# Patient Record
Sex: FEMALE | Race: BLACK OR AFRICAN AMERICAN | NOT HISPANIC OR LATINO | URBAN - METROPOLITAN AREA
[De-identification: names, ages, dates, MRNs, and addresses within clinical notes are randomized per-mention and may not be internally consistent; named-entity substitution may affect disease eponyms.]

---

## 2017-07-03 ENCOUNTER — EMERGENCY (EMERGENCY)
Facility: HOSPITAL | Age: 57
LOS: 1 days | Discharge: HOME | End: 2017-07-03
Attending: EMERGENCY MEDICINE | Admitting: EMERGENCY MEDICINE
Payer: COMMERCIAL

## 2017-07-03 VITALS
HEART RATE: 89 BPM | TEMPERATURE: 97 F | SYSTOLIC BLOOD PRESSURE: 135 MMHG | OXYGEN SATURATION: 96 % | DIASTOLIC BLOOD PRESSURE: 88 MMHG | RESPIRATION RATE: 16 BRPM

## 2017-07-03 VITALS
WEIGHT: 289.91 LBS | OXYGEN SATURATION: 96 % | TEMPERATURE: 99 F | DIASTOLIC BLOOD PRESSURE: 83 MMHG | SYSTOLIC BLOOD PRESSURE: 150 MMHG | HEART RATE: 105 BPM | RESPIRATION RATE: 22 BRPM | HEIGHT: 63 IN

## 2017-07-03 PROCEDURE — 71046 X-RAY EXAM CHEST 2 VIEWS: CPT

## 2017-07-03 PROCEDURE — 71020: CPT | Mod: 26

## 2017-07-03 PROCEDURE — 71120 X-RAY EXAM BREASTBONE 2/>VWS: CPT

## 2017-07-03 PROCEDURE — 99284 EMERGENCY DEPT VISIT MOD MDM: CPT | Mod: 25

## 2017-07-03 PROCEDURE — 72110 X-RAY EXAM L-2 SPINE 4/>VWS: CPT

## 2017-07-03 PROCEDURE — 72110 X-RAY EXAM L-2 SPINE 4/>VWS: CPT | Mod: 26

## 2017-07-03 PROCEDURE — 71120 X-RAY EXAM BREASTBONE 2/>VWS: CPT | Mod: 26

## 2017-07-03 PROCEDURE — 73562 X-RAY EXAM OF KNEE 3: CPT

## 2017-07-03 PROCEDURE — 73562 X-RAY EXAM OF KNEE 3: CPT | Mod: 26,50

## 2017-07-03 PROCEDURE — 93010 ELECTROCARDIOGRAM REPORT: CPT

## 2017-07-03 PROCEDURE — 93005 ELECTROCARDIOGRAM TRACING: CPT

## 2017-07-03 PROCEDURE — 99284 EMERGENCY DEPT VISIT MOD MDM: CPT

## 2017-07-03 RX ORDER — IBUPROFEN 200 MG
1 TABLET ORAL
Qty: 40 | Refills: 0
Start: 2017-07-03 | End: 2017-07-13

## 2017-07-03 RX ORDER — IBUPROFEN 200 MG
600 TABLET ORAL ONCE
Qty: 0 | Refills: 0 | Status: COMPLETED | OUTPATIENT
Start: 2017-07-03 | End: 2017-07-03

## 2017-07-03 RX ADMIN — Medication 600 MILLIGRAM(S): at 19:30

## 2017-07-03 RX ADMIN — Medication 600 MILLIGRAM(S): at 19:19
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
## 2017-07-03 NOTE — ED PROVIDER NOTE - MUSCULOSKELETAL, MLM
Spine appears normal, range of motion is not limited. sternal tenderness. ribs non-tender bilaterally. tender lower back and bilateral knees.
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
## 2019-12-31 ENCOUNTER — EMERGENCY (EMERGENCY)
Facility: HOSPITAL | Age: 59
LOS: 1 days | Discharge: HOME | End: 2019-12-31
Attending: EMERGENCY MEDICINE | Admitting: EMERGENCY MEDICINE
Payer: COMMERCIAL

## 2019-12-31 VITALS
SYSTOLIC BLOOD PRESSURE: 165 MMHG | DIASTOLIC BLOOD PRESSURE: 88 MMHG | TEMPERATURE: 98 F | HEART RATE: 92 BPM | RESPIRATION RATE: 17 BRPM | HEIGHT: 63 IN | OXYGEN SATURATION: 95 % | WEIGHT: 279.99 LBS

## 2019-12-31 DIAGNOSIS — R04.0 EPISTAXIS: ICD-10-CM

## 2019-12-31 PROBLEM — I10 ESSENTIAL (PRIMARY) HYPERTENSION: Chronic | Status: ACTIVE | Noted: 2017-07-03

## 2019-12-31 PROCEDURE — 99283 EMERGENCY DEPT VISIT LOW MDM: CPT

## 2019-12-31 NOTE — ED PROVIDER NOTE - CLINICAL SUMMARY MEDICAL DECISION MAKING FREE TEXT BOX
Pt is 60 y/o female, obese, with PMhx of HTN here for eval of epistaxis from Rt nostril earlier today. As per pt she was drinking tea and had spontaneous bleed from Rt nostril which was controlled with pressure. NOT bleeding currently. Denies HA, dizziness, cp, sob, palpitations, denies HA, blood thinners use, denies recent falls/head injury, easily bruising or gum bleeding. no URI sx. no active bleeding noted, pt is currently asymptomatic, bleeding controlled - pt advised to use humidifier at home, vaseline to nostrils, ent f/u if needed;

## 2019-12-31 NOTE — ED PROVIDER NOTE - PATIENT PORTAL LINK FT
You can access the FollowMyHealth Patient Portal offered by [facility] by registering at the following website: [email or web address]. By joining [employer]’s FollowMyHealth portal, you will also be able to view your health information using other applications (apps) compatible with our system.

## 2019-12-31 NOTE — ED PROVIDER NOTE - ATTENDING CONTRIBUTION TO CARE
I personally evaluated the patient. I reviewed the Resident’s or Physician Assistant’s note (as assigned above), and agree with the findings and plan except as documented in my note.    Patient developed a spontaneous nosebleed . Bleeding has stopped . No fever    PE: nares clear   no bleeding    observe

## 2019-12-31 NOTE — ED PROVIDER NOTE - NSFOLLOWUPINSTRUCTIONS_ED_ALL_ED_FT
PLEASE MAKE SURE THAT YOU USE HUMIDIFIER AT HOME, APPLY VASELINE TO NOSTRILS BEFORE GOING TO BED TO MOISTURIZE THE AREA. IF YOU START BLEEDING AGAIN, PLEASE APPLY PRESSURE TO THE AREA;     Nosebleed    WHAT YOU NEED TO KNOW:    A nosebleed, or epistaxis, occurs when one or more of the blood vessels in your nose break. You may have dark or bright red blood from one or both nostrils. A nosebleed is most commonly caused by dry air or picking your nose. A direct blow to your nose, irritation from a cold or allergies, or a foreign object can also cause a nosebleed.     DISCHARGE INSTRUCTIONS:    Return to the emergency department if:     Your nasal packing is soaked with blood.      Your nose is still bleeding after 20 minutes, even after you pinch it.       You have a foul-smelling discharge coming out of your nose.      You feel so weak and dizzy that you have trouble standing up.      You have trouble breathing or talking.     Contact your healthcare provider if:     You have a fever and are vomiting.      You have pain in and around your nose that is getting worse even after you take pain medicines.      Your nasal pack is loose.      You have questions or concerns about your condition or care.    First aid:     Sit up and lean forward. This will help prevent you from swallowing blood. Spit blood and saliva into a bowl.       Apply pressure to your nose. Use 2 fingers to pinch your nose shut for 10       Apply ice on the bridge of your nose to decrease swelling and bleeding. Use a cold pack or put crushed ice in a plastic bag. Cover it with a towel to protect your skin.      Pack your nose with a cotton ball, tissue, tampon, or gauze bandage to stop the bleeding.    Medicines:     Medicines applied to a small piece of cotton and placed in your nose. Medicine may also be sprayed in or applied directly to your nose. You may need medicine to prevent an infection. If bleeding is severe, medicine may be injected into a blood vessel in your nose.       Take your medicine as directed. Contact your healthcare provider if you think your medicine is not helping or if you have side effects. Tell him of her if you are allergic to any medicine. Keep a list of the medicines, vitamins, and herbs you take. Include the amounts, and when and why you take them. Bring the list or the pill bottles to follow-up visits. Carry your medicine list with you in case of an emergency.    Prevent another nosebleed:     Keep your nose moist. Put a small amount of petroleum jelly inside your nostrils as needed. Use a saline (saltwater) nasal spray. Do not put anything else inside your nose unless your healthcare provider says it is okay. Do not use oil-based lubricants if you use oxygen therapy. They may be flammable.      Use a cool mist humidifier to increase air moisture in your home. This will help your nose stay moist.       Do not pick or blow your nose for at least a week. You can irritate or damage your nose if you pick it. Blowing your nose too hard may cause the bleeding to start again. Do not bend over or strain as this can cause the bleeding to start again.      Avoid irritants such as tobacco smoke or chemical sprays such as .    Follow up with your healthcare provider as directed: Any packing in your nose should be removed within 2 to 3 days.

## 2019-12-31 NOTE — ED PROVIDER NOTE - OBJECTIVE STATEMENT
Pt is 60 y/o female, obese, with PMhx of HTN here for eval of epistaxis from Rt nostril earlier today. As per pt she was drinking tea and had spontaneous bleed from Rt nostril which was controlled with pressure. NOT bleeding currently. Denies HA, dizziness, cp, sob, palpitations, denies HA, blood thinners use, denies recent falls/head injury, easily bruising or gum bleeding. no URI sx.

## 2019-12-31 NOTE — ED ADULT NURSE NOTE - OBJECTIVE STATEMENT
Pt reports sudden onset nose bleed about thirty minutes prior to arrival to ED. On arrival to ED, nose bleed has stopped. Pt denies presence of pain. Denies chest pain, shortness of breath.

## 2020-06-10 ENCOUNTER — LABORATORY RESULT (OUTPATIENT)
Age: 60
End: 2020-06-10

## 2020-06-10 ENCOUNTER — APPOINTMENT (OUTPATIENT)
Dept: INTERNAL MEDICINE | Facility: CLINIC | Age: 60
End: 2020-06-10
Payer: MEDICAID

## 2020-06-10 VITALS
WEIGHT: 293 LBS | HEIGHT: 63 IN | SYSTOLIC BLOOD PRESSURE: 148 MMHG | DIASTOLIC BLOOD PRESSURE: 80 MMHG | HEART RATE: 83 BPM | OXYGEN SATURATION: 94 % | BODY MASS INDEX: 51.91 KG/M2 | TEMPERATURE: 98.6 F

## 2020-06-10 VITALS — SYSTOLIC BLOOD PRESSURE: 142 MMHG | DIASTOLIC BLOOD PRESSURE: 70 MMHG

## 2020-06-10 DIAGNOSIS — Z82.49 FAMILY HISTORY OF ISCHEMIC HEART DISEASE AND OTHER DISEASES OF THE CIRCULATORY SYSTEM: ICD-10-CM

## 2020-06-10 DIAGNOSIS — F51.05 ANXIETY DISORDER, UNSPECIFIED: ICD-10-CM

## 2020-06-10 DIAGNOSIS — Z87.891 PERSONAL HISTORY OF NICOTINE DEPENDENCE: ICD-10-CM

## 2020-06-10 DIAGNOSIS — F41.9 ANXIETY DISORDER, UNSPECIFIED: ICD-10-CM

## 2020-06-10 DIAGNOSIS — F41.8 OTHER SPECIFIED ANXIETY DISORDERS: ICD-10-CM

## 2020-06-10 DIAGNOSIS — H69.83 OTHER SPECIFIED DISORDERS OF EUSTACHIAN TUBE, BILATERAL: ICD-10-CM

## 2020-06-10 DIAGNOSIS — Z11.59 ENCOUNTER FOR SCREENING FOR OTHER VIRAL DISEASES: ICD-10-CM

## 2020-06-10 PROCEDURE — 99204 OFFICE O/P NEW MOD 45 MIN: CPT | Mod: 25

## 2020-06-10 PROCEDURE — 36415 COLL VENOUS BLD VENIPUNCTURE: CPT
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
## 2020-06-10 NOTE — PLAN
[FreeTextEntry1] : Start Temazepam for insomnia.  website consulted.\par BP borderline elevated - renewed atenolol-chlorthalidone. Low salt diet.\par Dizziness of unclear etiology. Check labs - CBC, CMP, TSH, B12, A1c, lipids, CRP, lyme.\par Neurology referral for headaches and UE pain likely neuropathy, possible cervical radiculopathy. \par \par RTO 1 week for f/u and discuss results.

## 2020-06-10 NOTE — REVIEW OF SYSTEMS
[Recent Change In Weight] : ~T recent weight change [Joint Pain] : joint pain [Headache] : headache [Dizziness] : dizziness [Insomnia] : insomnia [Anxiety] : anxiety [Depression] : depression [Negative] : Heme/Lymph [Vision Problems] : vision problems [Earache] : earache [Chest Pain] : chest pain [Back Pain] : back pain [Palpitations] : no palpitations [Lower Ext Edema] : no lower extremity edema [Confusion] : no confusion [Memory Loss] : no memory loss [Unsteady Walking] : no ataxia [Suicidal] : not suicidal [FreeTextEntry2] : intentional 15 lb weight loss [FreeTextEntry9] : b/l arm / knee pain [de-identified] : twitching in hands / fingers

## 2020-06-10 NOTE — PHYSICAL EXAM
[Normal Oropharynx] : the oropharynx was normal [Normal TMs] : both tympanic membranes were normal [No Lymphadenopathy] : no lymphadenopathy [Supple] : supple [No Respiratory Distress] : no respiratory distress  [Clear to Auscultation] : lungs were clear to auscultation bilaterally [No Edema] : there was no peripheral edema [Normal] : normal rate, regular rhythm, normal S1 and S2 and no murmur heard [Soft] : abdomen soft [Non Tender] : non-tender [Non-distended] : non-distended [Normal Bowel Sounds] : normal bowel sounds [No Joint Swelling] : no joint swelling [No Rash] : no rash [Normal Gait] : normal gait [Normal Affect] : the affect was normal [de-identified] : friendly female [de-identified] : fullness in left popliteal fossa

## 2020-06-15 LAB
25(OH)D3 SERPL-MCNC: 13.4 NG/ML
ALBUMIN SERPL ELPH-MCNC: 4.5 G/DL
ALP BLD-CCNC: 84 U/L
ALT SERPL-CCNC: 29 U/L
ANION GAP SERPL CALC-SCNC: 15 MMOL/L
AST SERPL-CCNC: 20 U/L
B BURGDOR IGG+IGM SER QL IB: NORMAL
BASOPHILS # BLD AUTO: 0 K/UL
BASOPHILS NFR BLD AUTO: 0 %
BILIRUB SERPL-MCNC: 0.7 MG/DL
BUN SERPL-MCNC: 22 MG/DL
CALCIUM SERPL-MCNC: 9.6 MG/DL
CHLORIDE SERPL-SCNC: 100 MMOL/L
CHOLEST SERPL-MCNC: 214 MG/DL
CHOLEST/HDLC SERPL: 4.5 RATIO
CO2 SERPL-SCNC: 27 MMOL/L
CREAT SERPL-MCNC: 1.15 MG/DL
CRP SERPL HS-MCNC: 2.81 MG/L
EOSINOPHIL # BLD AUTO: 0.24 K/UL
EOSINOPHIL NFR BLD AUTO: 2.6 %
ESTIMATED AVERAGE GLUCOSE: 137 MG/DL
GLUCOSE SERPL-MCNC: 122 MG/DL
HBA1C MFR BLD HPLC: 6.4 %
HCT VFR BLD CALC: 43.9 %
HDLC SERPL-MCNC: 48 MG/DL
HGB BLD-MCNC: 12.8 G/DL
LDLC SERPL CALC-MCNC: 130 MG/DL
LYMPHOCYTES # BLD AUTO: 4.42 K/UL
LYMPHOCYTES NFR BLD AUTO: 48.7 %
MAN DIFF?: NORMAL
MCHC RBC-ENTMCNC: 23.8 PG
MCHC RBC-ENTMCNC: 29.2 GM/DL
MCV RBC AUTO: 81.8 FL
MONOCYTES # BLD AUTO: 0.32 K/UL
MONOCYTES NFR BLD AUTO: 3.5 %
NEUTROPHILS # BLD AUTO: 3.95 K/UL
NEUTROPHILS NFR BLD AUTO: 43.5 %
PLATELET # BLD AUTO: 306 K/UL
POTASSIUM SERPL-SCNC: 3.7 MMOL/L
PROT SERPL-MCNC: 7.5 G/DL
RBC # BLD: 5.37 M/UL
RBC # FLD: 21.5 %
SARS-COV-2 IGG SERPL IA-ACNC: 8.09 INDEX
SARS-COV-2 IGG SERPL QL IA: POSITIVE
SODIUM SERPL-SCNC: 142 MMOL/L
TRIGL SERPL-MCNC: 180 MG/DL
TSH SERPL-ACNC: 0.74 UIU/ML
VIT B12 SERPL-MCNC: 356 PG/ML
WBC # FLD AUTO: 9.08 K/UL

## 2020-06-17 ENCOUNTER — APPOINTMENT (OUTPATIENT)
Dept: INTERNAL MEDICINE | Facility: CLINIC | Age: 60
End: 2020-06-17
Payer: MEDICAID

## 2020-06-17 VITALS
TEMPERATURE: 97.9 F | BODY MASS INDEX: 51.91 KG/M2 | HEART RATE: 74 BPM | WEIGHT: 293 LBS | DIASTOLIC BLOOD PRESSURE: 88 MMHG | OXYGEN SATURATION: 95 % | SYSTOLIC BLOOD PRESSURE: 138 MMHG | HEIGHT: 63 IN

## 2020-06-17 VITALS — SYSTOLIC BLOOD PRESSURE: 126 MMHG | DIASTOLIC BLOOD PRESSURE: 64 MMHG

## 2020-06-17 DIAGNOSIS — M62.831 MUSCLE SPASM OF CALF: ICD-10-CM

## 2020-06-17 DIAGNOSIS — E55.9 VITAMIN D DEFICIENCY, UNSPECIFIED: ICD-10-CM

## 2020-06-17 DIAGNOSIS — R42 DIZZINESS AND GIDDINESS: ICD-10-CM

## 2020-06-17 PROCEDURE — 99214 OFFICE O/P EST MOD 30 MIN: CPT

## 2020-06-17 RX ORDER — SERTRALINE 25 MG/1
25 TABLET, FILM COATED ORAL
Qty: 30 | Refills: 1 | Status: DISCONTINUED | COMMUNITY
End: 2020-06-17

## 2020-06-17 NOTE — HISTORY OF PRESENT ILLNESS
[de-identified] : Patient comes for 1 week follow-up.\par \par She reports improvement of insomnia since starting the Temazepam. \par She complains of pulling sensation in legs at night, feels worried about blood clot. She does not think legs are more edematous.\par Labs discussed - notable for elevated A1c 6.4 consistent with prediabetes. She has mild hyperlipidemia and low vitamin D. \par She has not yet made an appointment with the neurologist. \par  [FreeTextEntry1] : F/U, discuss lab results

## 2020-06-17 NOTE — PLAN
[FreeTextEntry1] : She has borderline diabetes. Discussed reducing carb intake and increasing physical activity. Lose weight.\par She continues to have pain and fullness in left calf. Send for b/l venous doppler study r/o DVT.\par Will arrange for neurology appointment for headaches, dizziness and UE pain suspected neuropathy.\par BP is controlled on atenolol-chlorthalidone.\par \par RTO in 2 months for F/U.

## 2020-06-17 NOTE — PHYSICAL EXAM
[Normal Oropharynx] : the oropharynx was normal [Normal TMs] : both tympanic membranes were normal [No Lymphadenopathy] : no lymphadenopathy [Supple] : supple [No Respiratory Distress] : no respiratory distress  [Clear to Auscultation] : lungs were clear to auscultation bilaterally [Normal] : normal rate, regular rhythm, normal S1 and S2 and no murmur heard [No Edema] : there was no peripheral edema [Soft] : abdomen soft [Non Tender] : non-tender [Non-distended] : non-distended [Normal Bowel Sounds] : normal bowel sounds [No Joint Swelling] : no joint swelling [Normal Gait] : normal gait [No Rash] : no rash [Normal Affect] : the affect was normal [de-identified] : fullness in left popliteal fossa  [de-identified] : friendly

## 2020-06-17 NOTE — REVIEW OF SYSTEMS
[Vision Problems] : vision problems [Recent Change In Weight] : ~T recent weight change [Earache] : earache [Chest Pain] : chest pain [Joint Pain] : joint pain [Back Pain] : back pain [Headache] : headache [Dizziness] : dizziness [Anxiety] : anxiety [Insomnia] : insomnia [Depression] : depression [Negative] : Heme/Lymph [Palpitations] : no palpitations [Confusion] : no confusion [Lower Ext Edema] : no lower extremity edema [Memory Loss] : no memory loss [Unsteady Walking] : no ataxia [FreeTextEntry2] : intentional 15 lb weight loss [Suicidal] : not suicidal [FreeTextEntry9] : b/l arm / knee pain [de-identified] : twitching in hands / fingers

## 2020-06-19 ENCOUNTER — APPOINTMENT (OUTPATIENT)
Dept: NEUROLOGY | Facility: CLINIC | Age: 60
End: 2020-06-19

## 2020-07-08 ENCOUNTER — RX RENEWAL (OUTPATIENT)
Age: 60
End: 2020-07-08

## 2020-08-18 ENCOUNTER — APPOINTMENT (OUTPATIENT)
Dept: INTERNAL MEDICINE | Facility: CLINIC | Age: 60
End: 2020-08-18
Payer: MEDICAID

## 2020-08-18 VITALS
WEIGHT: 280 LBS | HEIGHT: 63 IN | BODY MASS INDEX: 49.61 KG/M2 | DIASTOLIC BLOOD PRESSURE: 82 MMHG | TEMPERATURE: 98.1 F | SYSTOLIC BLOOD PRESSURE: 130 MMHG

## 2020-08-18 PROCEDURE — 99214 OFFICE O/P EST MOD 30 MIN: CPT

## 2020-08-18 NOTE — PHYSICAL EXAM
[Normal Oropharynx] : the oropharynx was normal [Normal TMs] : both tympanic membranes were normal [No Lymphadenopathy] : no lymphadenopathy [Clear to Auscultation] : lungs were clear to auscultation bilaterally [No Respiratory Distress] : no respiratory distress  [Supple] : supple [Normal] : normal rate, regular rhythm, normal S1 and S2 and no murmur heard [No Edema] : there was no peripheral edema [Soft] : abdomen soft [Non Tender] : non-tender [Non-distended] : non-distended [Normal Bowel Sounds] : normal bowel sounds [No Joint Swelling] : no joint swelling [No Rash] : no rash [Normal Gait] : normal gait [Normal Affect] : the affect was normal [de-identified] : friendly  [de-identified] : upset

## 2020-08-18 NOTE — PLAN
[FreeTextEntry1] : Chronic lower extremity pain, difficulty with ambulation. Start PT.\par Will get her another neurology appointment for chronic headaches, dizziness, and possible cervical radiculopathy.\par BP is controlled on atenolol-chlorthalidone.\par \par RTO in 1 months for F/U.

## 2020-08-18 NOTE — HISTORY OF PRESENT ILLNESS
[FreeTextEntry1] : F/U, discuss lab results [de-identified] : Patient comes for 1 week follow-up.\par \par She reports improvement of insomnia since starting the Temazepam. \par She complains of pulling sensation in legs at night, feels worried about blood clot. She does not think legs are more edematous.\par Labs discussed - notable for elevated A1c 6.4 consistent with prediabetes. She has mild hyperlipidemia and low vitamin D. \par She has not yet made an appointment with the neurologist. \par

## 2020-08-18 NOTE — PHYSICAL EXAM
[Normal Oropharynx] : the oropharynx was normal [Normal TMs] : both tympanic membranes were normal [No Lymphadenopathy] : no lymphadenopathy [Supple] : supple [No Respiratory Distress] : no respiratory distress  [Clear to Auscultation] : lungs were clear to auscultation bilaterally [Normal] : normal rate, regular rhythm, normal S1 and S2 and no murmur heard [No Edema] : there was no peripheral edema [Soft] : abdomen soft [Non Tender] : non-tender [Non-distended] : non-distended [No Rash] : no rash [Normal Bowel Sounds] : normal bowel sounds [No Joint Swelling] : no joint swelling [Normal Affect] : the affect was normal [Normal Gait] : normal gait [de-identified] : friendly  [de-identified] : fullness in left popliteal fossa

## 2020-08-18 NOTE — REVIEW OF SYSTEMS
[Recent Change In Weight] : ~T recent weight change [Vision Problems] : vision problems [Earache] : earache [Chest Pain] : chest pain [Joint Pain] : joint pain [Back Pain] : back pain [Headache] : headache [Dizziness] : dizziness [Insomnia] : insomnia [Anxiety] : anxiety [Depression] : depression [Negative] : Heme/Lymph [Palpitations] : no palpitations [Lower Ext Edema] : no lower extremity edema [Confusion] : no confusion [Memory Loss] : no memory loss [Unsteady Walking] : no ataxia [Suicidal] : not suicidal [FreeTextEntry2] : intentional 15 lb weight loss [FreeTextEntry9] : b/l arm / knee pain [de-identified] : twitching in hands / fingers

## 2020-08-18 NOTE — HISTORY OF PRESENT ILLNESS
[FreeTextEntry1] : F/U [de-identified] : Patient comes for follow-up.\par \par She still has pain in lower legs, described as pulling sensation, has difficulty walking up / down stairs. DVT study was negative. She has chronic headaches and dizziness. She lost her balance last Saturday and fell on the grass. Did not get hurt. She missed her neurologist appointment in June and needs to reschedule. She needs FMLA forms completed for work. \par \par

## 2020-08-18 NOTE — REVIEW OF SYSTEMS
[Recent Change In Weight] : ~T recent weight change [Vision Problems] : vision problems [Joint Pain] : joint pain [Back Pain] : back pain [Headache] : headache [Dizziness] : dizziness [Anxiety] : anxiety [Insomnia] : insomnia [Depression] : depression [Negative] : Neurological [Chest Pain] : no chest pain [Earache] : no earache [Lower Ext Edema] : no lower extremity edema [Palpitations] : no palpitations [Confusion] : no confusion [Memory Loss] : no memory loss [Unsteady Walking] : no ataxia [Suicidal] : not suicidal [FreeTextEntry2] : intentional 17 lb weight loss [FreeTextEntry9] : b/l arm / knee pain [de-identified] : twitching in hands / fingers

## 2020-09-23 ENCOUNTER — APPOINTMENT (OUTPATIENT)
Dept: INTERNAL MEDICINE | Facility: CLINIC | Age: 60
End: 2020-09-23
Payer: MEDICAID

## 2020-09-23 VITALS
SYSTOLIC BLOOD PRESSURE: 140 MMHG | DIASTOLIC BLOOD PRESSURE: 88 MMHG | BODY MASS INDEX: 51.91 KG/M2 | WEIGHT: 293 LBS | OXYGEN SATURATION: 95 % | TEMPERATURE: 98.3 F | HEART RATE: 83 BPM | HEIGHT: 63 IN

## 2020-09-23 VITALS — DIASTOLIC BLOOD PRESSURE: 76 MMHG | SYSTOLIC BLOOD PRESSURE: 138 MMHG

## 2020-09-23 PROCEDURE — 99214 OFFICE O/P EST MOD 30 MIN: CPT

## 2020-09-23 RX ORDER — FLUTICASONE PROPIONATE 50 UG/1
50 SPRAY, METERED NASAL
Qty: 16 | Refills: 0 | Status: DISCONTINUED | COMMUNITY
Start: 2020-06-10 | End: 2020-09-23

## 2020-09-23 NOTE — HISTORY OF PRESENT ILLNESS
[FreeTextEntry1] : F/U [de-identified] : Patient comes for follow-up.\par \par She has had a few sessions at [facility], scheduled to go back tomorrow. She has felt some relief but still experiencing pain in legs and neck. She still needs to see neurologist as she continues to have dizziness and headaches. Still with numbness in arms into hands. She is taking Tylenol arthritis but not helping her. She would like another pain medication. \par

## 2020-09-23 NOTE — PHYSICAL EXAM
[Normal Oropharynx] : the oropharynx was normal [Normal TMs] : both tympanic membranes were normal [No Lymphadenopathy] : no lymphadenopathy [Supple] : supple [No Respiratory Distress] : no respiratory distress  [Clear to Auscultation] : lungs were clear to auscultation bilaterally [Normal] : normal rate, regular rhythm, normal S1 and S2 and no murmur heard [No Edema] : there was no peripheral edema [Soft] : abdomen soft [Non Tender] : non-tender [Non-distended] : non-distended [Normal Bowel Sounds] : normal bowel sounds [No Joint Swelling] : no joint swelling [No Rash] : no rash [Normal Gait] : normal gait [Normal Affect] : the affect was normal [Normal Mood] : the mood was normal [de-identified] : friendly

## 2020-09-23 NOTE — PLAN
[FreeTextEntry1] : Continue PT for chronic lower extremity pain. Start Mobic and Tizanidine for pain management.\par Will arrange again for her to see neurology for chronic headaches, dizziness, and possible cervical radiculopathy.\par BP is controlled on atenolol-chlorthalidone.\par Borderline diabetic, A1c 6.4 in June. Will repeat fasting labs in 1 month.\par She declined flu shot today.\par She will send me another copy of the FMLA form to complete.\par \par RTO in 1 months for fasting labs.

## 2020-09-25 ENCOUNTER — APPOINTMENT (OUTPATIENT)
Dept: NEUROLOGY | Facility: CLINIC | Age: 60
End: 2020-09-25
Payer: MEDICAID

## 2020-09-25 VITALS — HEIGHT: 63 IN | WEIGHT: 290 LBS | TEMPERATURE: 97.8 F | BODY MASS INDEX: 51.38 KG/M2

## 2020-09-25 PROCEDURE — 99204 OFFICE O/P NEW MOD 45 MIN: CPT

## 2020-10-26 ENCOUNTER — APPOINTMENT (OUTPATIENT)
Dept: INTERNAL MEDICINE | Facility: CLINIC | Age: 60
End: 2020-10-26

## 2020-11-10 ENCOUNTER — NON-APPOINTMENT (OUTPATIENT)
Age: 60
End: 2020-11-10

## 2020-11-13 ENCOUNTER — APPOINTMENT (OUTPATIENT)
Dept: NEUROLOGY | Facility: CLINIC | Age: 60
End: 2020-11-13
Payer: MEDICAID

## 2020-11-13 PROCEDURE — 95911 NRV CNDJ TEST 9-10 STUDIES: CPT

## 2020-11-13 PROCEDURE — 95886 MUSC TEST DONE W/N TEST COMP: CPT

## 2020-11-13 PROCEDURE — 99072 ADDL SUPL MATRL&STAF TM PHE: CPT

## 2020-11-17 ENCOUNTER — APPOINTMENT (OUTPATIENT)
Dept: INTERNAL MEDICINE | Facility: CLINIC | Age: 60
End: 2020-11-17
Payer: MEDICAID

## 2020-11-17 VITALS
SYSTOLIC BLOOD PRESSURE: 130 MMHG | DIASTOLIC BLOOD PRESSURE: 80 MMHG | HEIGHT: 63 IN | TEMPERATURE: 97.7 F | BODY MASS INDEX: 51.91 KG/M2 | HEART RATE: 88 BPM | OXYGEN SATURATION: 95 % | WEIGHT: 293 LBS

## 2020-11-17 DIAGNOSIS — Z23 ENCOUNTER FOR IMMUNIZATION: ICD-10-CM

## 2020-11-17 PROCEDURE — 90686 IIV4 VACC NO PRSV 0.5 ML IM: CPT

## 2020-11-17 PROCEDURE — G0008: CPT

## 2020-11-17 PROCEDURE — 99214 OFFICE O/P EST MOD 30 MIN: CPT | Mod: 25

## 2020-11-17 RX ORDER — MELOXICAM 15 MG/1
15 TABLET ORAL DAILY
Qty: 30 | Refills: 3 | Status: DISCONTINUED | COMMUNITY
Start: 2020-09-23 | End: 2020-11-17

## 2020-11-17 NOTE — PHYSICAL EXAM
[Normal Oropharynx] : the oropharynx was normal [Normal TMs] : both tympanic membranes were normal [No Lymphadenopathy] : no lymphadenopathy [Supple] : supple [No Respiratory Distress] : no respiratory distress  [Clear to Auscultation] : lungs were clear to auscultation bilaterally [Normal] : normal rate, regular rhythm, normal S1 and S2 and no murmur heard [No Edema] : there was no peripheral edema [Soft] : abdomen soft [Non Tender] : non-tender [Non-distended] : non-distended [Normal Bowel Sounds] : normal bowel sounds [No Joint Swelling] : no joint swelling [No Rash] : no rash [Normal Gait] : normal gait [Normal Affect] : the affect was normal [Normal Mood] : the mood was normal [de-identified] : friendly

## 2020-11-17 NOTE — PLAN
[FreeTextEntry1] : Continue PT for chronic lower extremity pain. Start trial of Naproxen for pain, Tramadol PRN for severe pain.\par Letter written to return to work next month. FMLA forms were completed. \par Follow-up with neurology in 1/21. Awaiting EMG report from last week.\par BP is controlled on atenolol-chlorthalidone.\par Borderline diabetic, A1c 6.4 in June. Will repeat fasting labs in 1 month.\par Flu shot given today.\par \par RTO 1 month for physical.\par

## 2020-11-17 NOTE — HISTORY OF PRESENT ILLNESS
[FreeTextEntry1] : HTN, prediabetes, HLD  [de-identified] : Patient comes for 2 month follow-up.\par \par She saw neurologist Dr. Cintron in 9/20 for headaches and dizziness. Had MRI and MRA head which was normal. She had EMG last week which was reportedly abnormal. No report available in chart.\par She is going to PT this afternoon.\par She remains in pain mostly in legs. She is not finding relief with meloxicam and tizanidine. \par She is not fasting today.

## 2020-11-17 NOTE — REVIEW OF SYSTEMS
[Recent Change In Weight] : ~T recent weight change [Vision Problems] : vision problems [Joint Pain] : joint pain [Back Pain] : back pain [Headache] : headache [Dizziness] : dizziness [Insomnia] : insomnia [Anxiety] : anxiety [Depression] : depression [Negative] : Heme/Lymph [Earache] : no earache [Chest Pain] : no chest pain [Palpitations] : no palpitations [Lower Ext Edema] : no lower extremity edema [Confusion] : no confusion [Memory Loss] : no memory loss [Unsteady Walking] : no ataxia [Suicidal] : not suicidal [FreeTextEntry2] : 13 lb gain [FreeTextEntry9] : b/l arm / knee pain [de-identified] : twitching in hands / fingers

## 2020-12-15 ENCOUNTER — APPOINTMENT (OUTPATIENT)
Dept: INTERNAL MEDICINE | Facility: CLINIC | Age: 60
End: 2020-12-15
Payer: MEDICAID

## 2020-12-15 VITALS
DIASTOLIC BLOOD PRESSURE: 76 MMHG | TEMPERATURE: 97.8 F | SYSTOLIC BLOOD PRESSURE: 132 MMHG | BODY MASS INDEX: 51.91 KG/M2 | WEIGHT: 293 LBS | HEIGHT: 63 IN

## 2020-12-15 DIAGNOSIS — B35.1 TINEA UNGUIUM: ICD-10-CM

## 2020-12-15 PROCEDURE — 99214 OFFICE O/P EST MOD 30 MIN: CPT | Mod: 25

## 2020-12-15 PROCEDURE — 99072 ADDL SUPL MATRL&STAF TM PHE: CPT

## 2020-12-15 PROCEDURE — 36415 COLL VENOUS BLD VENIPUNCTURE: CPT

## 2020-12-15 NOTE — HISTORY OF PRESENT ILLNESS
[FreeTextEntry1] : HTN, prediabetes, HLD  [de-identified] : Patient comes for 1 month follow-up.\par \par She continues to go to PT. She found relief in leg pain with taking Naproxen. Never tried Tramadol.\par Continues to have head and neck pain.\par She has improved the diet, still unable to lose weight.\par Her half-sister  2 days ago from cancer, unknown type.\par She needs FMLA forms completed again.

## 2020-12-15 NOTE — PHYSICAL EXAM
[Normal Oropharynx] : the oropharynx was normal [Normal TMs] : both tympanic membranes were normal [No Lymphadenopathy] : no lymphadenopathy [Supple] : supple [No Respiratory Distress] : no respiratory distress  [Clear to Auscultation] : lungs were clear to auscultation bilaterally [Normal] : normal rate, regular rhythm, normal S1 and S2 and no murmur heard [No Edema] : there was no peripheral edema [Soft] : abdomen soft [Non Tender] : non-tender [Non-distended] : non-distended [Normal Bowel Sounds] : normal bowel sounds [No Joint Swelling] : no joint swelling [No Rash] : no rash [Normal Gait] : normal gait [Normal Affect] : the affect was normal [Normal Mood] : the mood was normal [de-identified] : friendly

## 2020-12-15 NOTE — PLAN
[FreeTextEntry1] : Continue PT for chronic lower extremity pain.Continue Naproxen for pain, Tramadol PRN for severe pain.\par FMLA forms were completed. \par Order MRI c-spine to evaluate for possible cervical radiculopathy. \par BP is controlled on atenolol-chlorthalidone - renewed.\par Borderline diabetic, A1c 6.4 in June. Repeat fasting labs today.\par \par RTO 1 month for follow-up.\par

## 2020-12-15 NOTE — REVIEW OF SYSTEMS
[Joint Pain] : joint pain [Back Pain] : back pain [Headache] : headache [Dizziness] : dizziness [Insomnia] : insomnia [Anxiety] : anxiety [Depression] : depression [Negative] : Heme/Lymph [Recent Change In Weight] : ~T no recent weight change [Earache] : no earache [Chest Pain] : no chest pain [Palpitations] : no palpitations [Lower Ext Edema] : no lower extremity edema [Confusion] : no confusion [Memory Loss] : no memory loss [Unsteady Walking] : no ataxia [Suicidal] : not suicidal [FreeTextEntry9] : b/l arm / knee pain [de-identified] : twitching in hands / fingers

## 2020-12-18 ENCOUNTER — RX RENEWAL (OUTPATIENT)
Age: 60
End: 2020-12-18

## 2021-01-12 ENCOUNTER — APPOINTMENT (OUTPATIENT)
Dept: NEUROLOGY | Facility: CLINIC | Age: 61
End: 2021-01-12
Payer: MEDICAID

## 2021-01-12 DIAGNOSIS — G56.93 UNSPECIFIED MONONEUROPATHY OF BILATERAL UPPER LIMBS: ICD-10-CM

## 2021-01-12 DIAGNOSIS — R51.9 HEADACHE, UNSPECIFIED: ICD-10-CM

## 2021-01-12 PROCEDURE — 99214 OFFICE O/P EST MOD 30 MIN: CPT | Mod: 95

## 2021-01-16 LAB
ALBUMIN SERPL ELPH-MCNC: 4.4 G/DL
ALP BLD-CCNC: 99 U/L
ALT SERPL-CCNC: 13 U/L
ANION GAP SERPL CALC-SCNC: 12 MMOL/L
AST SERPL-CCNC: 13 U/L
BASOPHILS # BLD AUTO: 0.06 K/UL
BASOPHILS NFR BLD AUTO: 0.7 %
BILIRUB SERPL-MCNC: 0.6 MG/DL
BUN SERPL-MCNC: 20 MG/DL
CALCIUM SERPL-MCNC: 9.3 MG/DL
CHLORIDE SERPL-SCNC: 103 MMOL/L
CHOLEST SERPL-MCNC: 223 MG/DL
CO2 SERPL-SCNC: 27 MMOL/L
CREAT SERPL-MCNC: 1.1 MG/DL
EOSINOPHIL # BLD AUTO: 0.14 K/UL
EOSINOPHIL NFR BLD AUTO: 1.7 %
ESTIMATED AVERAGE GLUCOSE: 143 MG/DL
GLUCOSE SERPL-MCNC: 117 MG/DL
HBA1C MFR BLD HPLC: 6.6 %
HCT VFR BLD CALC: 43.1 %
HDLC SERPL-MCNC: 51 MG/DL
HGB BLD-MCNC: 12.5 G/DL
IMM GRANULOCYTES NFR BLD AUTO: 0.2 %
LDLC SERPL CALC-MCNC: 144 MG/DL
LYMPHOCYTES # BLD AUTO: 3.04 K/UL
LYMPHOCYTES NFR BLD AUTO: 36.8 %
MAN DIFF?: NORMAL
MCHC RBC-ENTMCNC: 24.2 PG
MCHC RBC-ENTMCNC: 29 GM/DL
MCV RBC AUTO: 83.5 FL
MONOCYTES # BLD AUTO: 0.54 K/UL
MONOCYTES NFR BLD AUTO: 6.5 %
NEUTROPHILS # BLD AUTO: 4.45 K/UL
NEUTROPHILS NFR BLD AUTO: 54.1 %
NONHDLC SERPL-MCNC: 172 MG/DL
PLATELET # BLD AUTO: 255 K/UL
POTASSIUM SERPL-SCNC: 3.9 MMOL/L
PROT SERPL-MCNC: 7.3 G/DL
RBC # BLD: 5.16 M/UL
RBC # FLD: 17.3 %
SODIUM SERPL-SCNC: 142 MMOL/L
TRIGL SERPL-MCNC: 138 MG/DL
TSH SERPL-ACNC: 0.57 UIU/ML
WBC # FLD AUTO: 8.25 K/UL

## 2021-01-19 ENCOUNTER — APPOINTMENT (OUTPATIENT)
Dept: INTERNAL MEDICINE | Facility: CLINIC | Age: 61
End: 2021-01-19

## 2021-01-27 ENCOUNTER — APPOINTMENT (OUTPATIENT)
Dept: INTERNAL MEDICINE | Facility: CLINIC | Age: 61
End: 2021-01-27
Payer: MEDICAID

## 2021-01-27 VITALS
DIASTOLIC BLOOD PRESSURE: 70 MMHG | BODY MASS INDEX: 51.91 KG/M2 | SYSTOLIC BLOOD PRESSURE: 132 MMHG | HEIGHT: 63 IN | TEMPERATURE: 98.1 F | WEIGHT: 293 LBS

## 2021-01-27 PROCEDURE — 99072 ADDL SUPL MATRL&STAF TM PHE: CPT

## 2021-01-27 PROCEDURE — 99214 OFFICE O/P EST MOD 30 MIN: CPT

## 2021-01-27 NOTE — REVIEW OF SYSTEMS
[Joint Pain] : joint pain [Back Pain] : back pain [Headache] : headache [Dizziness] : dizziness [Insomnia] : insomnia [Anxiety] : anxiety [Depression] : depression [Negative] : Heme/Lymph [Recent Change In Weight] : ~T no recent weight change [Earache] : no earache [Chest Pain] : no chest pain [Palpitations] : no palpitations [Lower Ext Edema] : no lower extremity edema [Confusion] : no confusion [Memory Loss] : no memory loss [Unsteady Walking] : no ataxia [Suicidal] : not suicidal [FreeTextEntry9] : b/l arm / knee pain [de-identified] : twitching in hands / fingers

## 2021-01-27 NOTE — HISTORY OF PRESENT ILLNESS
[FreeTextEntry1] : HTN, prediabetes, HLD  [de-identified] : Patient comes for 1 month follow-up.\par \par She had dizzy episode at work last week, felt like spinning sensation. \par Some days she feels very lightheaded. \par She never went for MRI C-spine, was approved by insurance.\par She needs work form completed from employer.\par Has chronic pain in neck and legs. Needs refill on Tramadol. \par She may need renewal on PT which has worked well for her. \par Her neurologist thinks she has carpal tunnel, referred her to hand ortho. \par Labs from last month reviewed with pt. A1c worse now 6.6, lipids higher. She has worked on diet, but enjoys chips. Not exercising.

## 2021-01-27 NOTE — PHYSICAL EXAM
[Normal Oropharynx] : the oropharynx was normal [Normal TMs] : both tympanic membranes were normal [No Lymphadenopathy] : no lymphadenopathy [Supple] : supple [No Respiratory Distress] : no respiratory distress  [Clear to Auscultation] : lungs were clear to auscultation bilaterally [Normal] : normal rate, regular rhythm, normal S1 and S2 and no murmur heard [No Edema] : there was no peripheral edema [Soft] : abdomen soft [Non Tender] : non-tender [Non-distended] : non-distended [Normal Bowel Sounds] : normal bowel sounds [No Joint Swelling] : no joint swelling [No Rash] : no rash [Normal Gait] : normal gait [Normal Affect] : the affect was normal [Normal Mood] : the mood was normal [de-identified] : friendly

## 2021-03-18 ENCOUNTER — APPOINTMENT (OUTPATIENT)
Dept: INTERNAL MEDICINE | Facility: CLINIC | Age: 61
End: 2021-03-18

## 2021-03-23 ENCOUNTER — APPOINTMENT (OUTPATIENT)
Dept: INTERNAL MEDICINE | Facility: CLINIC | Age: 61
End: 2021-03-23
Payer: MEDICAID

## 2021-03-23 VITALS
HEART RATE: 90 BPM | WEIGHT: 293 LBS | OXYGEN SATURATION: 95 % | HEIGHT: 71 IN | BODY MASS INDEX: 41.02 KG/M2 | TEMPERATURE: 97.6 F | RESPIRATION RATE: 16 BRPM

## 2021-03-23 VITALS — DIASTOLIC BLOOD PRESSURE: 60 MMHG | SYSTOLIC BLOOD PRESSURE: 110 MMHG

## 2021-03-23 DIAGNOSIS — L30.9 DERMATITIS, UNSPECIFIED: ICD-10-CM

## 2021-03-23 DIAGNOSIS — M53.3 SACROCOCCYGEAL DISORDERS, NOT ELSEWHERE CLASSIFIED: ICD-10-CM

## 2021-03-23 PROCEDURE — 99072 ADDL SUPL MATRL&STAF TM PHE: CPT

## 2021-03-23 PROCEDURE — 99214 OFFICE O/P EST MOD 30 MIN: CPT

## 2021-03-23 RX ORDER — TEMAZEPAM 15 MG/1
15 CAPSULE ORAL
Qty: 14 | Refills: 0 | Status: DISCONTINUED | COMMUNITY
Start: 2020-06-10 | End: 2021-03-23

## 2021-03-23 NOTE — PHYSICAL EXAM
[Normal Oropharynx] : the oropharynx was normal [Normal TMs] : both tympanic membranes were normal [No Lymphadenopathy] : no lymphadenopathy [Supple] : supple [No Respiratory Distress] : no respiratory distress  [Clear to Auscultation] : lungs were clear to auscultation bilaterally [Normal] : normal rate, regular rhythm, normal S1 and S2 and no murmur heard [No Edema] : there was no peripheral edema [Soft] : abdomen soft [Non Tender] : non-tender [Non-distended] : non-distended [Normal Bowel Sounds] : normal bowel sounds [No Joint Swelling] : no joint swelling [No Rash] : no rash [Normal Gait] : normal gait [Normal Affect] : the affect was normal [Normal Mood] : the mood was normal [de-identified] : friendly  [de-identified] : tender point in medial coccyx, no surrounding ecchymosis

## 2021-03-23 NOTE — HISTORY OF PRESENT ILLNESS
[FreeTextEntry8] : Patient comes for an acute visit. 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 Fell on back / buttock. No LOC. She went back home and rested.\par She ran out of her pain medications. \par She wants letter to stay out of work for a few days.\par She would like to go back to [facility] PT. \par She has dry skin on right wrist, has used dry skin cream. \par

## 2021-03-23 NOTE — PLAN
[FreeTextEntry1] : Restart Naproxen 500 mg BID for pain. Tramadol PRN. Medications renewed.  website consulted.\par Letter written to stay out of work until 3/26.\par Rx given for PT for chronic LE pain.\par STart Triamcinolone cream for hand dermatitis. Use moisturizing cream.\par HTN is well-controlled, continue antihypertensive medication.\par \par RTO 1 month.

## 2021-03-23 NOTE — REVIEW OF SYSTEMS
[Joint Pain] : joint pain [Back Pain] : back pain [Headache] : headache [Dizziness] : dizziness [Insomnia] : insomnia [Anxiety] : anxiety [Depression] : depression [Negative] : Heme/Lymph [Recent Change In Weight] : ~T no recent weight change [Earache] : no earache [Chest Pain] : no chest pain [Palpitations] : no palpitations [Lower Ext Edema] : no lower extremity edema [Confusion] : no confusion [Memory Loss] : no memory loss [Unsteady Walking] : no ataxia [Suicidal] : not suicidal [FreeTextEntry9] : b/l arm / knee pain
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
## 2021-04-23 ENCOUNTER — APPOINTMENT (OUTPATIENT)
Dept: CARDIOLOGY | Facility: CLINIC | Age: 61
End: 2021-04-23
Payer: MEDICAID

## 2021-04-23 ENCOUNTER — NON-APPOINTMENT (OUTPATIENT)
Age: 61
End: 2021-04-23

## 2021-04-23 VITALS
BODY MASS INDEX: 51.91 KG/M2 | SYSTOLIC BLOOD PRESSURE: 125 MMHG | HEART RATE: 76 BPM | HEIGHT: 63 IN | OXYGEN SATURATION: 97 % | DIASTOLIC BLOOD PRESSURE: 81 MMHG | WEIGHT: 293 LBS

## 2021-04-23 PROCEDURE — 99072 ADDL SUPL MATRL&STAF TM PHE: CPT

## 2021-04-23 PROCEDURE — 99204 OFFICE O/P NEW MOD 45 MIN: CPT

## 2021-04-23 PROCEDURE — 93000 ELECTROCARDIOGRAM COMPLETE: CPT

## 2021-04-23 NOTE — DISCUSSION/SUMMARY
[FreeTextEntry1] : It is not clear whether what was discovered is plaque related to hypertension and cholesterol, etc., or degree of thrombosis for which a cardioembolic work-up needs to be performed.  I have requested records for my review.  In the meantime, I have suggested an echocardiogram, a carotid ultrasound and a Holter monitor.  She will schedule these, and I will be in contact with her to discuss the results.

## 2021-04-23 NOTE — HISTORY OF PRESENT ILLNESS
[FreeTextEntry1] : Nicci presented to the office today for a cardiovascular evaluation.  She presents for the further evaluation of an abnormal eye examination, and a history of hypertension.\par \par She is now 60 years old, with a history of hypertension and borderline diabetes.  She has a history of mild hyperlipidemia.  She is not known to have underlying structural heart disease.  She does have a history of obesity.\par \par At baseline, she is sedentary.  She describes an intermittent sharp chest discomfort which may cut her breathing off a bit, but she does not have symptoms overall suspicious for angina.  She has a tendency toward lower extremity edema, likely on the basis of obesity.  She denies palpitations, dizziness and syncope.\par \par She recently went to the ophthalmologist.  No reports are available, but she was told of a "blood clot" behind her eye.  She was told to see a cardiologist.  She therefore presents today for evaluation.

## 2021-04-23 NOTE — PHYSICAL EXAM
[General Appearance - Well Developed] : well developed [Normal Appearance] : normal appearance [Well Groomed] : well groomed [General Appearance - Well Nourished] : well nourished [No Deformities] : no deformities [General Appearance - In No Acute Distress] : no acute distress [Normal Conjunctiva] : the conjunctiva exhibited no abnormalities [Eyelids - No Xanthelasma] : the eyelids demonstrated no xanthelasmas [Normal Oral Mucosa] : normal oral mucosa [No Oral Pallor] : no oral pallor [No Oral Cyanosis] : no oral cyanosis [Normal Jugular Venous A Waves Present] : normal jugular venous A waves present [Normal Jugular Venous V Waves Present] : normal jugular venous V waves present [No Jugular Venous Eastman A Waves] : no jugular venous eastman A waves [Heart Rate And Rhythm] : heart rate and rhythm were normal [Heart Sounds] : normal S1 and S2 [Murmurs] : no murmurs present [Respiration, Rhythm And Depth] : normal respiratory rhythm and effort [Exaggerated Use Of Accessory Muscles For Inspiration] : no accessory muscle use [Auscultation Breath Sounds / Voice Sounds] : lungs were clear to auscultation bilaterally [Abdomen Soft] : soft [Abdomen Tenderness] : non-tender [Abdomen Mass (___ Cm)] : no abdominal mass palpated [FreeTextEntry1] : Abnormal gait [Nail Clubbing] : no clubbing of the fingernails [Cyanosis, Localized] : no localized cyanosis [Petechial Hemorrhages (___cm)] : no petechial hemorrhages [Skin Color & Pigmentation] : normal skin color and pigmentation [] : no rash [No Venous Stasis] : no venous stasis [Skin Lesions] : no skin lesions [No Skin Ulcers] : no skin ulcer [No Xanthoma] : no  xanthoma was observed [Oriented To Time, Place, And Person] : oriented to person, place, and time [Affect] : the affect was normal [Mood] : the mood was normal [No Anxiety] : not feeling anxious

## 2021-04-29 ENCOUNTER — APPOINTMENT (OUTPATIENT)
Dept: INTERNAL MEDICINE | Facility: CLINIC | Age: 61
End: 2021-04-29
Payer: MEDICAID

## 2021-04-29 VITALS
BODY MASS INDEX: 51.91 KG/M2 | SYSTOLIC BLOOD PRESSURE: 144 MMHG | TEMPERATURE: 97.9 F | HEIGHT: 63 IN | WEIGHT: 293 LBS | DIASTOLIC BLOOD PRESSURE: 70 MMHG

## 2021-04-29 VITALS — SYSTOLIC BLOOD PRESSURE: 138 MMHG | DIASTOLIC BLOOD PRESSURE: 70 MMHG

## 2021-04-29 PROCEDURE — 99213 OFFICE O/P EST LOW 20 MIN: CPT

## 2021-04-29 PROCEDURE — 99072 ADDL SUPL MATRL&STAF TM PHE: CPT

## 2021-04-29 NOTE — HISTORY OF PRESENT ILLNESS
[FreeTextEntry1] : HTN, prediabetes, HLD  [de-identified] : Patient comes for 1 month follow-up.\par \par She saw cardiologist last week after being told that she had blood clot behind eye at ophthalmologist office. She is scheduled for Holter, echo, and carotid ultrasound next month.\par Her left leg continues to be very painful. She is concerned of DVT. Doppler study in 7/20 was normal. She would like Rx for Voltaren gel. \par She lost her script for PT.

## 2021-04-29 NOTE — REVIEW OF SYSTEMS
[Joint Pain] : joint pain [Back Pain] : back pain [Headache] : headache [Dizziness] : dizziness [Insomnia] : insomnia [Anxiety] : anxiety [Depression] : depression [Negative] : Heme/Lymph [Recent Change In Weight] : ~T no recent weight change [Earache] : no earache [Chest Pain] : no chest pain [Palpitations] : no palpitations [Lower Ext Edema] : no lower extremity edema [Confusion] : no confusion [Memory Loss] : no memory loss [Unsteady Walking] : no ataxia [Suicidal] : not suicidal [FreeTextEntry9] : lower leg pain L>R [de-identified] : twitching in hands / fingers

## 2021-04-29 NOTE — PHYSICAL EXAM
[Normal Oropharynx] : the oropharynx was normal [Normal TMs] : both tympanic membranes were normal [No Lymphadenopathy] : no lymphadenopathy [Supple] : supple [No Respiratory Distress] : no respiratory distress  [Clear to Auscultation] : lungs were clear to auscultation bilaterally [Normal] : normal rate, regular rhythm, normal S1 and S2 and no murmur heard [No Edema] : there was no peripheral edema [Soft] : abdomen soft [Non Tender] : non-tender [Non-distended] : non-distended [Normal Bowel Sounds] : normal bowel sounds [No Joint Swelling] : no joint swelling [No Rash] : no rash [Normal Gait] : normal gait [Normal Mood] : the mood was normal [Normal Affect] : the affect was normal [de-identified] : friendly

## 2021-05-18 ENCOUNTER — APPOINTMENT (OUTPATIENT)
Dept: CARDIOLOGY | Facility: CLINIC | Age: 61
End: 2021-05-18
Payer: MEDICAID

## 2021-05-18 PROCEDURE — 93224 XTRNL ECG REC UP TO 48 HRS: CPT

## 2021-05-19 ENCOUNTER — APPOINTMENT (OUTPATIENT)
Dept: CARDIOLOGY | Facility: CLINIC | Age: 61
End: 2021-05-19
Payer: MEDICAID

## 2021-05-19 PROCEDURE — 93306 TTE W/DOPPLER COMPLETE: CPT

## 2021-05-19 PROCEDURE — 93880 EXTRACRANIAL BILAT STUDY: CPT

## 2021-05-19 RX ORDER — PERFLUTREN 6.52 MG/ML
6.52 INJECTION, SUSPENSION INTRAVENOUS
Qty: 1 | Refills: 0 | Status: COMPLETED | OUTPATIENT
Start: 2021-05-19

## 2021-05-19 RX ADMIN — PERFLUTREN MG/ML: 6.52 INJECTION, SUSPENSION INTRAVENOUS at 00:00

## 2021-05-26 ENCOUNTER — RX RENEWAL (OUTPATIENT)
Age: 61
End: 2021-05-26

## 2021-06-10 ENCOUNTER — APPOINTMENT (OUTPATIENT)
Dept: INTERNAL MEDICINE | Facility: CLINIC | Age: 61
End: 2021-06-10
Payer: MEDICAID

## 2021-06-10 VITALS
OXYGEN SATURATION: 94 % | HEART RATE: 74 BPM | TEMPERATURE: 98.4 F | HEIGHT: 63 IN | BODY MASS INDEX: 51.91 KG/M2 | WEIGHT: 293 LBS | SYSTOLIC BLOOD PRESSURE: 110 MMHG | DIASTOLIC BLOOD PRESSURE: 70 MMHG

## 2021-06-10 DIAGNOSIS — G43.909 MIGRAINE, UNSPECIFIED, NOT INTRACTABLE, W/OUT STATUS MIGRAINOSUS: ICD-10-CM

## 2021-06-10 PROCEDURE — 99213 OFFICE O/P EST LOW 20 MIN: CPT

## 2021-06-10 RX ORDER — TRAMADOL HYDROCHLORIDE 50 MG/1
50 TABLET, COATED ORAL
Qty: 30 | Refills: 0 | Status: DISCONTINUED | COMMUNITY
Start: 2020-11-17 | End: 2021-06-10

## 2021-06-10 NOTE — REVIEW OF SYSTEMS
[Joint Pain] : joint pain [Back Pain] : back pain [Headache] : headache [Dizziness] : dizziness [Insomnia] : insomnia [Anxiety] : anxiety [Depression] : depression [Negative] : Heme/Lymph [Recent Change In Weight] : ~T no recent weight change [Earache] : no earache [Chest Pain] : no chest pain [Palpitations] : no palpitations [Lower Ext Edema] : no lower extremity edema [Confusion] : no confusion [Memory Loss] : no memory loss [Unsteady Walking] : no ataxia [Suicidal] : not suicidal [FreeTextEntry9] : lower leg pain L>R [de-identified] : twitching in hands / fingers

## 2021-06-10 NOTE — PHYSICAL EXAM
[Normal Oropharynx] : the oropharynx was normal [Normal TMs] : both tympanic membranes were normal [No Lymphadenopathy] : no lymphadenopathy [Supple] : supple [No Respiratory Distress] : no respiratory distress  [Clear to Auscultation] : lungs were clear to auscultation bilaterally [Normal] : normal rate, regular rhythm, normal S1 and S2 and no murmur heard [No Edema] : there was no peripheral edema [Soft] : abdomen soft [Non Tender] : non-tender [Non-distended] : non-distended [Normal Bowel Sounds] : normal bowel sounds [No Joint Swelling] : no joint swelling [No Rash] : no rash [Normal Gait] : normal gait [Normal Affect] : the affect was normal [Normal Mood] : the mood was normal [de-identified] : friendly

## 2021-06-10 NOTE — HISTORY OF PRESENT ILLNESS
[FreeTextEntry1] : HTN, prediabetes, HLD  [de-identified] : Patient comes for 1 month follow-up.\par \par She had bad headache and dizziness earlier today. She skipped work today as a result.\par She had sharp pain in upper back.\par She is scheduled this month to see pain management. She also scheduled appointment to go to PT.

## 2021-08-05 ENCOUNTER — APPOINTMENT (OUTPATIENT)
Dept: INTERNAL MEDICINE | Facility: CLINIC | Age: 61
End: 2021-08-05

## 2021-09-30 ENCOUNTER — APPOINTMENT (OUTPATIENT)
Dept: INTERNAL MEDICINE | Facility: CLINIC | Age: 61
End: 2021-09-30

## 2021-10-20 ENCOUNTER — APPOINTMENT (OUTPATIENT)
Dept: INTERNAL MEDICINE | Facility: CLINIC | Age: 61
End: 2021-10-20

## 2021-11-04 ENCOUNTER — APPOINTMENT (OUTPATIENT)
Dept: INTERNAL MEDICINE | Facility: CLINIC | Age: 61
End: 2021-11-04

## 2021-12-08 ENCOUNTER — APPOINTMENT (OUTPATIENT)
Dept: INTERNAL MEDICINE | Facility: CLINIC | Age: 61
End: 2021-12-08
Payer: MEDICAID

## 2021-12-08 VITALS
OXYGEN SATURATION: 97 % | WEIGHT: 293 LBS | TEMPERATURE: 98.6 F | HEART RATE: 74 BPM | HEIGHT: 63 IN | BODY MASS INDEX: 51.91 KG/M2 | DIASTOLIC BLOOD PRESSURE: 60 MMHG | SYSTOLIC BLOOD PRESSURE: 110 MMHG

## 2021-12-08 VITALS — SYSTOLIC BLOOD PRESSURE: 110 MMHG | DIASTOLIC BLOOD PRESSURE: 70 MMHG

## 2021-12-08 PROCEDURE — 90686 IIV4 VACC NO PRSV 0.5 ML IM: CPT

## 2021-12-08 PROCEDURE — G0008: CPT

## 2021-12-08 PROCEDURE — 99214 OFFICE O/P EST MOD 30 MIN: CPT | Mod: 25

## 2021-12-08 NOTE — PHYSICAL EXAM
[Normal Oropharynx] : the oropharynx was normal [Normal TMs] : both tympanic membranes were normal [No Lymphadenopathy] : no lymphadenopathy [Supple] : supple [No Respiratory Distress] : no respiratory distress  [Clear to Auscultation] : lungs were clear to auscultation bilaterally [Normal] : normal rate, regular rhythm, normal S1 and S2 and no murmur heard [No Edema] : there was no peripheral edema [Soft] : abdomen soft [Non Tender] : non-tender [Non-distended] : non-distended [Normal Bowel Sounds] : normal bowel sounds [No Joint Swelling] : no joint swelling [No Rash] : no rash [Normal Gait] : normal gait [Normal Affect] : the affect was normal [Normal Mood] : the mood was normal [de-identified] : friendly

## 2021-12-08 NOTE — HISTORY OF PRESENT ILLNESS
[FreeTextEntry1] : HTN, prediabetes, HLD  [de-identified] : Patient comes for 1 month follow-up.\par \par She never went to pain management. She has chronic neck/back pain. Has chronic LE pain. Her pain meds are no longer helping. Legs are very bothersome, has difficulty walking. She cannot lose weight, still weighs about 300 lb. She is tearful about her situation, is worried about her 's health.

## 2021-12-08 NOTE — REVIEW OF SYSTEMS
[Joint Pain] : joint pain [Back Pain] : back pain [Headache] : headache [Dizziness] : dizziness [Insomnia] : insomnia [Anxiety] : anxiety [Depression] : depression [Negative] : Heme/Lymph [Recent Change In Weight] : ~T no recent weight change [Earache] : no earache [Chest Pain] : no chest pain [Palpitations] : no palpitations [Lower Ext Edema] : no lower extremity edema [Confusion] : no confusion [Memory Loss] : no memory loss [Unsteady Walking] : no ataxia [Suicidal] : not suicidal [FreeTextEntry9] : lower leg pain L>R [de-identified] : twitching in hands / fingers

## 2022-01-10 ENCOUNTER — APPOINTMENT (OUTPATIENT)
Dept: INTERNAL MEDICINE | Facility: CLINIC | Age: 62
End: 2022-01-10
Payer: MEDICAID

## 2022-01-10 VITALS
DIASTOLIC BLOOD PRESSURE: 60 MMHG | BODY MASS INDEX: 51.91 KG/M2 | HEART RATE: 84 BPM | OXYGEN SATURATION: 94 % | WEIGHT: 293 LBS | SYSTOLIC BLOOD PRESSURE: 120 MMHG | HEIGHT: 63 IN | TEMPERATURE: 98.5 F

## 2022-01-10 DIAGNOSIS — M79.605 PAIN IN RIGHT LEG: ICD-10-CM

## 2022-01-10 DIAGNOSIS — Z12.31 ENCOUNTER FOR SCREENING MAMMOGRAM FOR MALIGNANT NEOPLASM OF BREAST: ICD-10-CM

## 2022-01-10 DIAGNOSIS — Z01.419 ENCOUNTER FOR GYNECOLOGICAL EXAMINATION (GENERAL) (ROUTINE) W/OUT ABNORMAL FINDINGS: ICD-10-CM

## 2022-01-10 DIAGNOSIS — M79.604 PAIN IN RIGHT LEG: ICD-10-CM

## 2022-01-10 PROCEDURE — 99214 OFFICE O/P EST MOD 30 MIN: CPT

## 2022-01-10 RX ORDER — BUTALBITAL, ACETAMINOPHEN AND CAFFEINE 325; 50; 40 MG/1; MG/1; MG/1
50-325-40 TABLET ORAL
Qty: 100 | Refills: 3 | Status: DISCONTINUED | COMMUNITY
Start: 2021-06-10 | End: 2022-01-10

## 2022-01-10 RX ORDER — TIZANIDINE 4 MG/1
4 TABLET ORAL
Qty: 30 | Refills: 3 | Status: DISCONTINUED | COMMUNITY
Start: 2020-09-23 | End: 2022-01-10

## 2022-01-10 RX ORDER — NAPROXEN 500 MG/1
500 TABLET ORAL
Qty: 60 | Refills: 1 | Status: DISCONTINUED | COMMUNITY
Start: 2020-11-17 | End: 2022-01-10

## 2022-01-10 RX ORDER — DICLOFENAC SODIUM 1% 10 MG/G
1 GEL TOPICAL TWICE DAILY
Qty: 1 | Refills: 3 | Status: ACTIVE | COMMUNITY
Start: 2021-04-29 | End: 1900-01-01

## 2022-01-10 NOTE — PHYSICAL EXAM
[Normal Oropharynx] : the oropharynx was normal [Normal TMs] : both tympanic membranes were normal [No Lymphadenopathy] : no lymphadenopathy [Supple] : supple [No Respiratory Distress] : no respiratory distress  [Clear to Auscultation] : lungs were clear to auscultation bilaterally [Normal] : normal rate, regular rhythm, normal S1 and S2 and no murmur heard [No Edema] : there was no peripheral edema [Soft] : abdomen soft [Non Tender] : non-tender [Non-distended] : non-distended [Normal Bowel Sounds] : normal bowel sounds [No Joint Swelling] : no joint swelling [No Rash] : no rash [Normal Gait] : normal gait [Normal Affect] : the affect was normal [Normal Mood] : the mood was normal [de-identified] : friendly

## 2022-01-10 NOTE — HISTORY OF PRESENT ILLNESS
[FreeTextEntry1] : HTN, prediabetes, HLD, chronic pain  [de-identified] : Patient comes for 1 month follow-up.\par \par She has chronic neck/back pain. Has chronic LE pain. Legs are very bothersome, has difficulty walking. She scheduled pain management consult next week. She found relief with Percocet. She needs FMLA forms completed.

## 2022-01-10 NOTE — REVIEW OF SYSTEMS
[Joint Pain] : joint pain [Back Pain] : back pain [Headache] : headache [Dizziness] : dizziness [Insomnia] : insomnia [Anxiety] : anxiety [Depression] : depression [Negative] : Heme/Lymph [Recent Change In Weight] : ~T no recent weight change [Earache] : no earache [Chest Pain] : no chest pain [Palpitations] : no palpitations [Lower Ext Edema] : no lower extremity edema [Confusion] : no confusion [Memory Loss] : no memory loss [Unsteady Walking] : no ataxia [Suicidal] : not suicidal [de-identified] : twitching in hands / fingers  [FreeTextEntry9] : lower leg pain L>R

## 2022-01-17 ENCOUNTER — TRANSCRIPTION ENCOUNTER (OUTPATIENT)
Age: 62
End: 2022-01-17

## 2022-01-26 ENCOUNTER — APPOINTMENT (OUTPATIENT)
Dept: INTERNAL MEDICINE | Facility: CLINIC | Age: 62
End: 2022-01-26
Payer: MEDICAID

## 2022-01-26 VITALS
RESPIRATION RATE: 18 BRPM | WEIGHT: 293 LBS | OXYGEN SATURATION: 96 % | HEART RATE: 77 BPM | DIASTOLIC BLOOD PRESSURE: 60 MMHG | SYSTOLIC BLOOD PRESSURE: 96 MMHG | HEIGHT: 63 IN | TEMPERATURE: 98.7 F | BODY MASS INDEX: 51.91 KG/M2

## 2022-01-26 VITALS — SYSTOLIC BLOOD PRESSURE: 138 MMHG | DIASTOLIC BLOOD PRESSURE: 70 MMHG

## 2022-01-26 DIAGNOSIS — R42 DIZZINESS AND GIDDINESS: ICD-10-CM

## 2022-01-26 DIAGNOSIS — R11.0 NAUSEA: ICD-10-CM

## 2022-01-26 PROCEDURE — 99214 OFFICE O/P EST MOD 30 MIN: CPT | Mod: 25

## 2022-01-26 NOTE — PLAN
[FreeTextEntry1] : Non-specific dizziness and nausea, possibly due to gastroenteritis.\par Send labs - CBC, CMP, TSH, B12, lipase, ESR, and CRP. \par Continue adequate fluids.\par Check lipid panel and A1c. Continue dietary improvements.\par BP controlled, continue Atenolol-Chlorthalidone.\par Copy of FMLA completed forms given to patient.\par

## 2022-01-26 NOTE — REVIEW OF SYSTEMS
[Joint Pain] : joint pain [Back Pain] : back pain [Headache] : headache [Dizziness] : dizziness [Insomnia] : insomnia [Anxiety] : anxiety [Depression] : depression [Negative] : Heme/Lymph [Recent Change In Weight] : ~T no recent weight change [Earache] : no earache [Chest Pain] : no chest pain [Palpitations] : no palpitations [Lower Ext Edema] : no lower extremity edema [Nausea] : nausea [Confusion] : no confusion [Memory Loss] : no memory loss [Unsteady Walking] : no ataxia [Suicidal] : not suicidal [FreeTextEntry9] : lower leg pain L>R [de-identified] : twitching in hands / fingers

## 2022-01-26 NOTE — PHYSICAL EXAM
[Normal Oropharynx] : the oropharynx was normal [Normal TMs] : both tympanic membranes were normal [No Lymphadenopathy] : no lymphadenopathy [Supple] : supple [No Respiratory Distress] : no respiratory distress  [Clear to Auscultation] : lungs were clear to auscultation bilaterally [Normal] : normal rate, regular rhythm, normal S1 and S2 and no murmur heard [No Edema] : there was no peripheral edema [Soft] : abdomen soft [Non Tender] : non-tender [Non-distended] : non-distended [Normal Bowel Sounds] : normal bowel sounds [No Joint Swelling] : no joint swelling [No Rash] : no rash [Normal Gait] : normal gait [Normal Affect] : the affect was normal [Normal Mood] : the mood was normal [de-identified] : friendly

## 2022-01-26 NOTE — HISTORY OF PRESENT ILLNESS
[FreeTextEntry8] : Patient comes for an acute visit. \par \par She is here for evaluation of dizziness, fatigue, and nausea for few days. No fever or chills.\par She gets nauseous with anything she eats. \par She is drinking lot of water. \par No abdominal pain or diarrhea. \par

## 2022-01-30 LAB
ALBUMIN SERPL ELPH-MCNC: 4.3 G/DL
ALP BLD-CCNC: 98 U/L
ALT SERPL-CCNC: 14 U/L
ANION GAP SERPL CALC-SCNC: 13 MMOL/L
AST SERPL-CCNC: 14 U/L
BASOPHILS # BLD AUTO: 0.05 K/UL
BASOPHILS NFR BLD AUTO: 0.6 %
BILIRUB SERPL-MCNC: 0.4 MG/DL
BUN SERPL-MCNC: 21 MG/DL
CALCIUM SERPL-MCNC: 9 MG/DL
CHLORIDE SERPL-SCNC: 98 MMOL/L
CHOLEST SERPL-MCNC: 217 MG/DL
CO2 SERPL-SCNC: 30 MMOL/L
CREAT SERPL-MCNC: 1.03 MG/DL
CRP SERPL-MCNC: 7 MG/L
EOSINOPHIL # BLD AUTO: 0.17 K/UL
EOSINOPHIL NFR BLD AUTO: 1.9 %
ERYTHROCYTE [SEDIMENTATION RATE] IN BLOOD BY WESTERGREN METHOD: 74 MM/HR
ESTIMATED AVERAGE GLUCOSE: 157 MG/DL
GLUCOSE SERPL-MCNC: 153 MG/DL
HBA1C MFR BLD HPLC: 7.1 %
HCT VFR BLD CALC: 42.2 %
HDLC SERPL-MCNC: 47 MG/DL
HGB BLD-MCNC: 12.6 G/DL
IMM GRANULOCYTES NFR BLD AUTO: 0.2 %
LDLC SERPL CALC-MCNC: 136 MG/DL
LPL SERPL-CCNC: 42 U/L
LYMPHOCYTES # BLD AUTO: 2.94 K/UL
LYMPHOCYTES NFR BLD AUTO: 33 %
MAN DIFF?: NORMAL
MCHC RBC-ENTMCNC: 23.6 PG
MCHC RBC-ENTMCNC: 29.9 GM/DL
MCV RBC AUTO: 78.9 FL
MONOCYTES # BLD AUTO: 0.63 K/UL
MONOCYTES NFR BLD AUTO: 7.1 %
NEUTROPHILS # BLD AUTO: 5.11 K/UL
NEUTROPHILS NFR BLD AUTO: 57.2 %
NONHDLC SERPL-MCNC: 170 MG/DL
PLATELET # BLD AUTO: 250 K/UL
POTASSIUM SERPL-SCNC: 5.1 MMOL/L
PROT SERPL-MCNC: 7.4 G/DL
RBC # BLD: 5.35 M/UL
RBC # FLD: 17.4 %
SODIUM SERPL-SCNC: 141 MMOL/L
TRIGL SERPL-MCNC: 169 MG/DL
TSH SERPL-ACNC: 0.59 UIU/ML
VIT B12 SERPL-MCNC: 340 PG/ML
WBC # FLD AUTO: 8.92 K/UL

## 2022-02-03 ENCOUNTER — NON-APPOINTMENT (OUTPATIENT)
Age: 62
End: 2022-02-03

## 2022-02-14 ENCOUNTER — APPOINTMENT (OUTPATIENT)
Dept: INTERNAL MEDICINE | Facility: CLINIC | Age: 62
End: 2022-02-14
Payer: MEDICAID

## 2022-02-14 VITALS
HEIGHT: 63 IN | HEART RATE: 80 BPM | SYSTOLIC BLOOD PRESSURE: 130 MMHG | DIASTOLIC BLOOD PRESSURE: 80 MMHG | WEIGHT: 293 LBS | OXYGEN SATURATION: 96 % | BODY MASS INDEX: 51.91 KG/M2

## 2022-02-14 DIAGNOSIS — N89.8 OTHER SPECIFIED NONINFLAMMATORY DISORDERS OF VAGINA: ICD-10-CM

## 2022-02-14 PROCEDURE — 99214 OFFICE O/P EST MOD 30 MIN: CPT

## 2022-02-14 RX ORDER — AMOXICILLIN 500 MG/1
500 CAPSULE ORAL
Qty: 21 | Refills: 0 | Status: DISCONTINUED | COMMUNITY
Start: 2021-10-13

## 2022-02-14 RX ORDER — SULFACETAMIDE SODIUM 100 MG/ML
10 SOLUTION OPHTHALMIC
Qty: 15 | Refills: 0 | Status: DISCONTINUED | COMMUNITY
Start: 2022-01-17

## 2022-02-14 NOTE — HISTORY OF PRESENT ILLNESS
[FreeTextEntry1] : HTN, HLD, DM [de-identified] : Patient comes for follow-up.\par \par Had labs last month. A1c donnie to 7.1. Gained weight now 312 lb. She is motivated to lose weight.\par The dizziness has improved but still queasy at times.\par She complains of vaginal pruritus and urinary frequency. No dysuria or hematuria. Vaginal area is malodorous. \par Still with chronic pain in lower back and legs. She is having trouble scheduling appointment with pain management.

## 2022-02-14 NOTE — REVIEW OF SYSTEMS
[Joint Pain] : joint pain [Back Pain] : back pain [Headache] : headache [Dizziness] : dizziness [Insomnia] : insomnia [Anxiety] : anxiety [Depression] : depression [Negative] : Heme/Lymph [Recent Change In Weight] : ~T no recent weight change [Earache] : no earache [Chest Pain] : no chest pain [Palpitations] : no palpitations [Lower Ext Edema] : no lower extremity edema [Frequency] : frequency [Confusion] : no confusion [Memory Loss] : no memory loss [Unsteady Walking] : no ataxia [Suicidal] : not suicidal [FreeTextEntry8] : vaginal pruritus  [FreeTextEntry9] : lower leg pain L>R [de-identified] : twitching in hands / fingers

## 2022-02-14 NOTE — PHYSICAL EXAM
[Normal Oropharynx] : the oropharynx was normal [Normal TMs] : both tympanic membranes were normal [No Lymphadenopathy] : no lymphadenopathy [Supple] : supple [No Respiratory Distress] : no respiratory distress  [Clear to Auscultation] : lungs were clear to auscultation bilaterally [Normal] : normal rate, regular rhythm, normal S1 and S2 and no murmur heard [No Edema] : there was no peripheral edema [Soft] : abdomen soft [Non Tender] : non-tender [Non-distended] : non-distended [Normal Bowel Sounds] : normal bowel sounds [No Joint Swelling] : no joint swelling [No Rash] : no rash [Normal Gait] : normal gait [Normal Affect] : the affect was normal [Normal Mood] : the mood was normal [de-identified] : friendly

## 2022-03-17 ENCOUNTER — APPOINTMENT (OUTPATIENT)
Dept: INTERNAL MEDICINE | Facility: CLINIC | Age: 62
End: 2022-03-17
Payer: MEDICAID

## 2022-03-17 VITALS
OXYGEN SATURATION: 97 % | SYSTOLIC BLOOD PRESSURE: 130 MMHG | WEIGHT: 293 LBS | BODY MASS INDEX: 51.91 KG/M2 | TEMPERATURE: 98 F | HEIGHT: 63 IN | DIASTOLIC BLOOD PRESSURE: 70 MMHG | HEART RATE: 75 BPM

## 2022-03-17 DIAGNOSIS — R73.03 PREDIABETES.: ICD-10-CM

## 2022-03-17 PROCEDURE — 99213 OFFICE O/P EST LOW 20 MIN: CPT

## 2022-03-17 NOTE — HISTORY OF PRESENT ILLNESS
[FreeTextEntry1] : HTN, HLD, DM [de-identified] : Patient comes for 1 month follow-up.\par \par She has not yet scheduled pain management appointment, "I lost the papers you provided." She has had recent intense neck pain along with chronic back pain. She has relief with Percocet.\par Has had abdominal discomfort and diarrhea since Saturday. No N/V or fever. She is trying to improve diet. Weight unchanged from last month at 312 lb. She is going to gym tomorrow for first time with daughter.

## 2022-03-17 NOTE — REVIEW OF SYSTEMS
[Frequency] : frequency [Joint Pain] : joint pain [Back Pain] : back pain [Headache] : headache [Dizziness] : dizziness [Insomnia] : insomnia [Anxiety] : anxiety [Depression] : depression [Negative] : Heme/Lymph [Recent Change In Weight] : ~T no recent weight change [Earache] : no earache [Chest Pain] : no chest pain [Palpitations] : no palpitations [Lower Ext Edema] : no lower extremity edema [Confusion] : no confusion [Memory Loss] : no memory loss [Unsteady Walking] : no ataxia [Suicidal] : not suicidal [FreeTextEntry9] : neck pain [FreeTextEntry8] : vaginal pruritus

## 2022-03-17 NOTE — PHYSICAL EXAM
[Normal TMs] : both tympanic membranes were normal [Normal Oropharynx] : the oropharynx was normal [No Lymphadenopathy] : no lymphadenopathy [Supple] : supple [No Respiratory Distress] : no respiratory distress  [Clear to Auscultation] : lungs were clear to auscultation bilaterally [Normal] : normal rate, regular rhythm, normal S1 and S2 and no murmur heard [No Edema] : there was no peripheral edema [Soft] : abdomen soft [Non Tender] : non-tender [Non-distended] : non-distended [Normal Bowel Sounds] : normal bowel sounds [No Joint Swelling] : no joint swelling [No Rash] : no rash [Normal Gait] : normal gait [Normal Affect] : the affect was normal [Normal Mood] : the mood was normal [de-identified] : friendly

## 2022-05-11 ENCOUNTER — APPOINTMENT (OUTPATIENT)
Dept: INTERNAL MEDICINE | Facility: CLINIC | Age: 62
End: 2022-05-11
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
## 2022-06-13 ENCOUNTER — RX RENEWAL (OUTPATIENT)
Age: 62
End: 2022-06-13

## 2023-02-02 ENCOUNTER — NON-APPOINTMENT (OUTPATIENT)
Age: 63
End: 2023-02-02

## 2023-02-02 ENCOUNTER — APPOINTMENT (OUTPATIENT)
Dept: INTERNAL MEDICINE | Facility: CLINIC | Age: 63
End: 2023-02-02
Payer: COMMERCIAL

## 2023-02-02 VITALS — DIASTOLIC BLOOD PRESSURE: 70 MMHG | SYSTOLIC BLOOD PRESSURE: 110 MMHG

## 2023-02-02 VITALS
HEART RATE: 79 BPM | WEIGHT: 293 LBS | TEMPERATURE: 98 F | OXYGEN SATURATION: 96 % | BODY MASS INDEX: 51.91 KG/M2 | SYSTOLIC BLOOD PRESSURE: 144 MMHG | DIASTOLIC BLOOD PRESSURE: 78 MMHG | HEIGHT: 63 IN

## 2023-02-02 DIAGNOSIS — E11.9 TYPE 2 DIABETES MELLITUS W/OUT COMPLICATIONS: ICD-10-CM

## 2023-02-02 DIAGNOSIS — Z12.11 ENCOUNTER FOR SCREENING FOR MALIGNANT NEOPLASM OF COLON: ICD-10-CM

## 2023-02-02 DIAGNOSIS — M54.50 LOW BACK PAIN, UNSPECIFIED: ICD-10-CM

## 2023-02-02 DIAGNOSIS — G89.29 LOW BACK PAIN, UNSPECIFIED: ICD-10-CM

## 2023-02-02 DIAGNOSIS — Z00.00 ENCOUNTER FOR GENERAL ADULT MEDICAL EXAMINATION W/OUT ABNORMAL FINDINGS: ICD-10-CM

## 2023-02-02 DIAGNOSIS — E78.5 HYPERLIPIDEMIA, UNSPECIFIED: ICD-10-CM

## 2023-02-02 DIAGNOSIS — E66.01 MORBID (SEVERE) OBESITY DUE TO EXCESS CALORIES: ICD-10-CM

## 2023-02-02 DIAGNOSIS — I10 ESSENTIAL (PRIMARY) HYPERTENSION: ICD-10-CM

## 2023-02-02 PROCEDURE — G0008: CPT

## 2023-02-02 PROCEDURE — 90686 IIV4 VACC NO PRSV 0.5 ML IM: CPT

## 2023-02-02 PROCEDURE — 93000 ELECTROCARDIOGRAM COMPLETE: CPT

## 2023-02-02 PROCEDURE — 99396 PREV VISIT EST AGE 40-64: CPT | Mod: 25

## 2023-02-02 RX ORDER — ATENOLOL AND CHLORTHALIDONE 50; 25 MG/1; MG/1
50-25 TABLET ORAL
Qty: 90 | Refills: 3 | Status: ACTIVE | COMMUNITY
Start: 2020-12-18 | End: 1900-01-01

## 2023-02-02 RX ORDER — OXYCODONE AND ACETAMINOPHEN 5; 325 MG/1; MG/1
5-325 TABLET ORAL
Qty: 30 | Refills: 0 | Status: ACTIVE | COMMUNITY
Start: 2021-12-08 | End: 1900-01-01

## 2023-02-02 RX ORDER — IBUPROFEN 600 MG/1
600 TABLET, FILM COATED ORAL
Qty: 100 | Refills: 3 | Status: ACTIVE | COMMUNITY
Start: 2021-10-13 | End: 1900-01-01

## 2023-02-02 RX ORDER — FLUCONAZOLE 200 MG/1
200 TABLET ORAL
Qty: 1 | Refills: 0 | Status: DISCONTINUED | COMMUNITY
Start: 2022-02-14 | End: 2023-02-02

## 2023-02-02 RX ORDER — TRIAMCINOLONE ACETONIDE 1 MG/G
0.1 CREAM TOPICAL TWICE DAILY
Qty: 1 | Refills: 3 | Status: DISCONTINUED | COMMUNITY
Start: 2021-03-23 | End: 2023-02-02

## 2023-02-02 NOTE — PHYSICAL EXAM
[Normal Oropharynx] : the oropharynx was normal [Normal TMs] : both tympanic membranes were normal [No Lymphadenopathy] : no lymphadenopathy [Supple] : supple [No Respiratory Distress] : no respiratory distress  [Clear to Auscultation] : lungs were clear to auscultation bilaterally [Normal] : normal rate, regular rhythm, normal S1 and S2 and no murmur heard [No Edema] : there was no peripheral edema [Soft] : abdomen soft [Non Tender] : non-tender [Non-distended] : non-distended [Normal Bowel Sounds] : normal bowel sounds [No Joint Swelling] : no joint swelling [No Rash] : no rash [Normal Gait] : normal gait [Normal Affect] : the affect was normal [Normal Mood] : the mood was normal [No JVD] : no jugular venous distention [Coordination Grossly Intact] : coordination grossly intact [de-identified] : friendly  [de-identified] : obese

## 2023-02-02 NOTE — HEALTH RISK ASSESSMENT
[No] : In the past 12 months have you used drugs other than those required for medical reasons? No [No falls in past year] : Patient reported no falls in the past year [0] : 2) Feeling down, depressed, or hopeless: Not at all (0) [PHQ-2 Negative - No further assessment needed] : PHQ-2 Negative - No further assessment needed [None] : None [With Family] : lives with family [Retired] : retired [] :  [# Of Children ___] : has [unfilled] children [Fully functional (bathing, dressing, toileting, transferring, walking, feeding)] : Fully functional (bathing, dressing, toileting, transferring, walking, feeding) [Fully functional (using the telephone, shopping, preparing meals, housekeeping, doing laundry, using] : Fully functional and needs no help or supervision to perform IADLs (using the telephone, shopping, preparing meals, housekeeping, doing laundry, using transportation, managing medications and managing finances) [Smoke Detector] : smoke detector [Carbon Monoxide Detector] : carbon monoxide detector [Seat Belt] :  uses seat belt [Sunscreen] : uses sunscreen [Former] : Former [20 or more] : 20 or more [> 15 Years] : > 15 Years [Patient reported mammogram was normal] : Patient reported mammogram was normal [Patient declined colonoscopy] : Patient declined colonoscopy [RVC1Gomnn] : 0 [Sexually Active] : not sexually active [High Risk Behavior] : no high risk behavior [Reports changes in hearing] : Reports no changes in hearing [Reports changes in vision] : Reports no changes in vision [Reports changes in dental health] : Reports no changes in dental health [MammogramDate] : 05/93 [MammogramComments] : at least 30 yr ago [ColonoscopyComments] : never

## 2023-02-02 NOTE — REVIEW OF SYSTEMS
[Frequency] : frequency [Joint Pain] : joint pain [Back Pain] : back pain [Headache] : headache [Dizziness] : dizziness [Insomnia] : insomnia [Anxiety] : anxiety [Depression] : depression [Negative] : Heme/Lymph [Recent Change In Weight] : ~T recent weight change [Earache] : no earache [Chest Pain] : no chest pain [Palpitations] : no palpitations [Lower Ext Edema] : no lower extremity edema [Confusion] : no confusion [Memory Loss] : no memory loss [Unsteady Walking] : no ataxia [Suicidal] : not suicidal [FreeTextEntry2] : lost few lb [FreeTextEntry8] : vaginal odor

## 2023-02-02 NOTE — HISTORY OF PRESENT ILLNESS
[FreeTextEntry1] : physical [de-identified] : Patient comes for an annual exam.\par \par She reports vaginal odor, not described as fishy. Denies vaginal discharge. No dysuria or frequency. She is trying to find a GYN who takes her insurance.\par She still has not seen pain management for her chronic low back / leg pain. Has muscle spasms in legs.\par She has tried to improve diet. Lost a few lbs. \par Not fasting for labs today. \par

## 2023-02-03 LAB
APPEARANCE: CLEAR
BACTERIA: NEGATIVE
BILIRUBIN URINE: NEGATIVE
BLOOD URINE: NEGATIVE
COLOR: YELLOW
CREAT SPEC-SCNC: 256 MG/DL
GLUCOSE QUALITATIVE U: NEGATIVE
HYALINE CASTS: 3 /LPF
KETONES URINE: NEGATIVE
LEUKOCYTE ESTERASE URINE: NEGATIVE
MICROALBUMIN 24H UR DL<=1MG/L-MCNC: <1.2 MG/DL
MICROALBUMIN/CREAT 24H UR-RTO: NORMAL MG/G
MICROSCOPIC-UA: NORMAL
NITRITE URINE: NEGATIVE
PH URINE: 6
PROTEIN URINE: NORMAL
RED BLOOD CELLS URINE: 5 /HPF
SPECIFIC GRAVITY URINE: 1.03
SQUAMOUS EPITHELIAL CELLS: 2 /HPF
UROBILINOGEN URINE: NORMAL
WHITE BLOOD CELLS URINE: 3 /HPF

## 2023-03-14 ENCOUNTER — EMERGENCY (EMERGENCY)
Facility: HOSPITAL | Age: 63
LOS: 1 days | Discharge: HOME | End: 2023-03-14
Attending: EMERGENCY MEDICINE | Admitting: EMERGENCY MEDICINE
Payer: COMMERCIAL

## 2023-03-14 VITALS
HEART RATE: 111 BPM | SYSTOLIC BLOOD PRESSURE: 152 MMHG | DIASTOLIC BLOOD PRESSURE: 84 MMHG | WEIGHT: 293 LBS | OXYGEN SATURATION: 96 % | HEIGHT: 62 IN | RESPIRATION RATE: 18 BRPM | TEMPERATURE: 98 F

## 2023-03-14 LAB
25(OH)D3 SERPL-MCNC: 11.6 NG/ML
ALBUMIN SERPL ELPH-MCNC: 4.2 G/DL
ALP BLD-CCNC: 106 U/L
ALT SERPL-CCNC: 16 U/L
ANION GAP SERPL CALC-SCNC: 10 MMOL/L
AST SERPL-CCNC: 14 U/L
BASOPHILS # BLD AUTO: 0.06 K/UL
BASOPHILS NFR BLD AUTO: 0.6 %
BILIRUB SERPL-MCNC: 0.5 MG/DL
BUN SERPL-MCNC: 15 MG/DL
CALCIUM SERPL-MCNC: 8.9 MG/DL
CHLORIDE SERPL-SCNC: 102 MMOL/L
CHOLEST SERPL-MCNC: 205 MG/DL
CO2 SERPL-SCNC: 31 MMOL/L
CREAT SERPL-MCNC: 0.97 MG/DL
EGFR: 66 ML/MIN/1.73M2
EOSINOPHIL # BLD AUTO: 0.15 K/UL
EOSINOPHIL NFR BLD AUTO: 1.6 %
ESTIMATED AVERAGE GLUCOSE: 174 MG/DL
GLUCOSE SERPL-MCNC: 157 MG/DL
HBA1C MFR BLD HPLC: 7.7 %
HCT VFR BLD CALC: 43.2 %
HDLC SERPL-MCNC: 51 MG/DL
HEMOCCULT STL QL IA: NEGATIVE
HGB BLD-MCNC: 12.6 G/DL
IMM GRANULOCYTES NFR BLD AUTO: 0.3 %
LDLC SERPL CALC-MCNC: 135 MG/DL
LYMPHOCYTES # BLD AUTO: 2.67 K/UL
LYMPHOCYTES NFR BLD AUTO: 28.5 %
MAN DIFF?: NORMAL
MCHC RBC-ENTMCNC: 23.5 PG
MCHC RBC-ENTMCNC: 29.2 GM/DL
MCV RBC AUTO: 80.6 FL
MONOCYTES # BLD AUTO: 0.66 K/UL
MONOCYTES NFR BLD AUTO: 7 %
NEUTROPHILS # BLD AUTO: 5.8 K/UL
NEUTROPHILS NFR BLD AUTO: 62 %
NONHDLC SERPL-MCNC: 154 MG/DL
PLATELET # BLD AUTO: 266 K/UL
POTASSIUM SERPL-SCNC: 4.2 MMOL/L
PROT SERPL-MCNC: 7.1 G/DL
RBC # BLD: 5.36 M/UL
RBC # FLD: 18.6 %
SODIUM SERPL-SCNC: 143 MMOL/L
TRIGL SERPL-MCNC: 96 MG/DL
TSH SERPL-ACNC: 0.61 UIU/ML
VIT B12 SERPL-MCNC: 312 PG/ML
WBC # FLD AUTO: 9.37 K/UL

## 2023-03-14 PROCEDURE — 99284 EMERGENCY DEPT VISIT MOD MDM: CPT

## 2023-03-14 PROCEDURE — 73110 X-RAY EXAM OF WRIST: CPT

## 2023-03-14 PROCEDURE — 73110 X-RAY EXAM OF WRIST: CPT | Mod: 26,LT

## 2023-03-14 PROCEDURE — 73562 X-RAY EXAM OF KNEE 3: CPT

## 2023-03-14 PROCEDURE — 73562 X-RAY EXAM OF KNEE 3: CPT | Mod: 26,LT

## 2023-03-14 RX ORDER — METFORMIN HYDROCHLORIDE 500 MG/1
500 TABLET, COATED ORAL
Qty: 180 | Refills: 1 | Status: ACTIVE | COMMUNITY
Start: 2023-03-14 | End: 1900-01-01

## 2023-03-14 RX ORDER — ERGOCALCIFEROL 1.25 MG/1
1.25 MG CAPSULE, LIQUID FILLED ORAL
Qty: 12 | Refills: 3 | Status: ACTIVE | COMMUNITY
Start: 2020-06-17 | End: 1900-01-01

## 2023-03-14 RX ORDER — ATENOLOL AND CHLORTHALIDONE 50; 25 MG/1; MG/1
1 TABLET ORAL
Qty: 0 | Refills: 0 | DISCHARGE

## 2023-03-14 RX ORDER — ROSUVASTATIN CALCIUM 5 MG/1
5 TABLET, FILM COATED ORAL
Qty: 90 | Refills: 1 | Status: ACTIVE | COMMUNITY
Start: 2023-03-14 | End: 1900-01-01

## 2023-03-14 NOTE — ED ADULT TRIAGE NOTE - STATUS:
1920 Bedside shift change report given to Onur Lechuga RN and Wilman Lira RN (oncoming nurse) by Missy Parsons RN (offgoing nurse). Report included the following information SBAR, Intake/Output, MAR and Recent Results. 2056 Pt. joined at bedside by support person and baby. AAOx4. Pain 2/10. Fundus firm at U-1, scant rubra lochia. No clots noted. Educated on signs and symptoms to report and plan of care. No further questions or concerns at this time. Callbell within reach. Bed in lowest position. 2342 Pt sitting in bed.    0710 Bedside shift change report given to Brook Alegria RN (oncoming nurse) by Onur Lechuga RN (offgoing nurse). Report included the following information SBAR, Intake/Output, MAR and Recent Results. Applied

## 2023-03-14 NOTE — ED PROVIDER NOTE - CARE PROVIDER_API CALL
Jean Marie Luciano)  Orthopaedic Sports Medicine; Orthopaedic Surgery  [address] [address]  Phone: [phone]  Fax: [phone]  Follow Up Time:

## 2023-03-14 NOTE — ED PROVIDER NOTE - PATIENT PORTAL LINK FT
You can access the FollowMyHealth Patient Portal offered by [facility] by registering at the following website: [email or web address]. By joining [facility]’s FollowMyHealth portal, you will also be able to view your health information using other applications (apps) compatible with our system.

## 2023-03-14 NOTE — ED PROVIDER NOTE - NSFOLLOWUPINSTRUCTIONS_ED_ALL_ED_FT
EnglishSpanish                                                                                                                                          Knee Sprain       A knee sprain is a stretch or tear in a knee ligament. Knee ligaments are tissues that connect bones in the knee to each other.      What are the causes?    This condition often results from:  •A fall.       •An injury to the knee.        What are the signs or symptoms?    Symptoms of this condition include:  •Trouble straightening or bending the leg.       •Swelling in the knee.       •Bruising around the knee.       •Tenderness or pain in the knee.       •Sudden muscle tightening (spasms) around the knee.        How is this treated?    Treatment for this condition may involve:  •Keeping the knee still (immobilized) with a cast, brace, or splint.      •Putting ice on the knee. This helps with pain and swelling.      •Raising (elevating) the knee above the level of your heart when you are resting. This helps with pain and swelling.      •Taking medicine for pain.       •Doing exercises to keep your knee from being weak or stiff.      •Having surgery. This may be needed if the ligament is completely torn.        Follow these instructions at home:    If you have a splint or brace:     •Wear it as told by your doctor. Remove it only as told by your doctor.      •Check the skin around it every day. Tell your doctor about any concerns.    •Loosen it if your toes:  •Tingle.      •Become numb.      •Turn cold and blue.        •Keep it clean and dry.      If you have a cast:     • Do not stick anything inside it to scratch your skin.      •Check the skin around it every day. Tell your doctor about any concerns.      •You may put lotion on dry skin around the edges of the cast. Do not put lotion on the skin under the cast.      •Keep it clean and dry.      Bathing     If you have a splint, brace, or cast that is not waterproof:  •Do not let it get wet.      •Cover it with a watertight covering when you take a bath or a shower.        Managing pain, stiffness, and swelling  •If told, put ice on the injured area. To do this:  •If you have a removable splint or brace, remove it as told by your doctor.      •Put ice in a plastic bag.      •Place a towel between your skin and the bag or between your cast and the bag.      •Leave the ice on for 20 minutes, 2–3 times a day.        •Move your toes often to reduce stiffness and swelling.      •Raise the injured area above the level of your heart while you are sitting or lying down.      General instructions    •Take over-the-counter and prescription medicines only as told by your doctor.      •Do not use any products that contain nicotine or tobacco, such as cigarettes, e-cigarettes, and chewing tobacco. These can delay healing. If you need help quitting, ask your doctor.      •Do exercises as told by your doctor.      •Keep all follow-up visits as told by your doctor. This is important.        Contact a doctor if:    •Your pain gets worse.      •The cast, brace, or splint does not fit right.      •The cast, brace, or splint gets damaged.        Get help right away if:    •You cannot use your knee to support your body weight (bear weight) for standing or walking.      •You cannot move the injured area.      •Your knee roya or you have pain after you walk only a few steps.      •You have very bad pain, swelling, or numbness in your leg below the cast, brace, or splint.      •Your foot or toes are numb, cold, or blue after loosening your splint or brace.        Summary    •A knee sprain is a stretch or tear in a tissue (ligament) that connects your knee bones to each other.      •You may need to wear a splint, brace, or cast to keep the knee still while it is getting better.      •Surgery may be needed if the ligament is completely torn.      This information is not intended to replace advice given to you by your health care provider. Make sure you discuss any questions you have with your health care provider
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
## 2023-03-14 NOTE — ED PROVIDER NOTE - OBJECTIVE STATEMENT
63yo female bib ems s/p fall at work with left wrist and left knee pain. 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

## 2023-03-22 ENCOUNTER — APPOINTMENT (OUTPATIENT)
Dept: ORTHOPEDIC SURGERY | Facility: CLINIC | Age: 63
End: 2023-03-22

## 2023-03-31 ENCOUNTER — NON-APPOINTMENT (OUTPATIENT)
Age: 63
End: 2023-03-31

## 2023-05-03 ENCOUNTER — APPOINTMENT (OUTPATIENT)
Dept: INTERNAL MEDICINE | Facility: CLINIC | Age: 63
End: 2023-05-03
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
## 2023-09-10 NOTE — REVIEW OF SYSTEMS
[Recent Change In Weight] : ~T recent weight change [Earache] : no earache [Chest Pain] : no chest pain [Palpitations] : no palpitations [Lower Ext Edema] : no lower extremity edema [Frequency] : frequency [Joint Pain] : joint pain [Headache] : headache [Back Pain] : back pain [Dizziness] : dizziness [Confusion] : no confusion [Memory Loss] : no memory loss [Unsteady Walking] : no ataxia [Suicidal] : not suicidal [Insomnia] : insomnia [Anxiety] : anxiety [Depression] : depression [Negative] : Heme/Lymph [FreeTextEntry2] : lost few lb [FreeTextEntry8] : vaginal odor

## 2023-09-10 NOTE — PHYSICAL EXAM
[Normal Oropharynx] : the oropharynx was normal [Normal TMs] : both tympanic membranes were normal [No JVD] : no jugular venous distention [No Lymphadenopathy] : no lymphadenopathy [Supple] : supple [Clear to Auscultation] : lungs were clear to auscultation bilaterally [No Respiratory Distress] : no respiratory distress  [Normal] : normal rate, regular rhythm, normal S1 and S2 and no murmur heard [No Edema] : there was no peripheral edema [Soft] : abdomen soft [Non Tender] : non-tender [Non-distended] : non-distended [Normal Bowel Sounds] : normal bowel sounds [No Joint Swelling] : no joint swelling [No Rash] : no rash [Coordination Grossly Intact] : coordination grossly intact [Normal Gait] : normal gait [Normal Mood] : the mood was normal [Normal Affect] : the affect was normal [de-identified] : friendly  [de-identified] : obese

## 2023-09-11 ENCOUNTER — APPOINTMENT (OUTPATIENT)
Dept: INTERNAL MEDICINE | Facility: CLINIC | Age: 63
End: 2023-09-11

## 2024-09-30 ENCOUNTER — APPOINTMENT (OUTPATIENT)
Dept: CARDIOLOGY | Facility: CLINIC | Age: 64
End: 2024-09-30

## 2024-09-30 NOTE — HISTORY OF PRESENT ILLNESS
[FreeTextEntry1] : Nicci is a 64-year-old female DM, HTN, HLD who presents for evaluation of abnormal ECG>

## 2024-10-08 ENCOUNTER — APPOINTMENT (OUTPATIENT)
Dept: CARDIOLOGY | Facility: CLINIC | Age: 64
End: 2024-10-08

## 2025-06-09 NOTE — ED PROVIDER NOTE - VASCULAR COMPROMISE
Attempted to call patient back, reached voicemail.  Patient has appointment tomorrow, she can address symptoms then.  
Pt called stating she had UTI and requested to have antibiotics sent in. Noticed it has been a while since we had seen the patient and made an acute appointment for 06/11 with [place].     Pt would like call back on what to do, and to see if they need to keep the current appointment, or just have meds ordered for them.   
no vascular compromise/pulses full and equal bilaterally